# Patient Record
Sex: MALE | Race: WHITE | NOT HISPANIC OR LATINO | Employment: OTHER | URBAN - NONMETROPOLITAN AREA
[De-identification: names, ages, dates, MRNs, and addresses within clinical notes are randomized per-mention and may not be internally consistent; named-entity substitution may affect disease eponyms.]

---

## 2023-02-21 PROBLEM — D72.829 LEUKOCYTOSIS: Status: ACTIVE | Noted: 2023-02-21

## 2023-02-21 PROBLEM — K92.2 GASTROINTESTINAL BLEEDING: Status: ACTIVE | Noted: 2023-02-21

## 2023-02-21 PROBLEM — F41.9 ANXIETY: Status: ACTIVE | Noted: 2023-02-21

## 2023-02-21 PROBLEM — J90 PLEURAL EFFUSION, LEFT: Status: ACTIVE | Noted: 2023-02-21

## 2023-02-21 PROBLEM — M54.2 NECK PAIN: Status: ACTIVE | Noted: 2023-02-21

## 2023-02-21 PROBLEM — H91.90 HARD OF HEARING: Status: ACTIVE | Noted: 2023-02-21

## 2023-02-21 PROBLEM — M79.671 BILATERAL LEG AND FOOT PAIN: Status: ACTIVE | Noted: 2023-02-21

## 2023-02-21 PROBLEM — I71.43 INFRARENAL ABDOMINAL AORTIC ANEURYSM (AAA) WITHOUT RUPTURE (CMS-HCC): Status: ACTIVE | Noted: 2023-02-21

## 2023-02-21 PROBLEM — E11.40 DIABETIC NEUROPATHY (MULTI): Status: ACTIVE | Noted: 2023-02-21

## 2023-02-21 PROBLEM — R49.0 VOICE HOARSENESS: Status: ACTIVE | Noted: 2023-02-21

## 2023-02-21 PROBLEM — M79.89 LEFT LEG SWELLING: Status: ACTIVE | Noted: 2023-02-21

## 2023-02-21 PROBLEM — M79.604 BILATERAL LEG AND FOOT PAIN: Status: ACTIVE | Noted: 2023-02-21

## 2023-02-21 PROBLEM — M54.50 LUMBAGO: Status: ACTIVE | Noted: 2023-02-21

## 2023-02-21 PROBLEM — I48.91 ATRIAL FIBRILLATION (MULTI): Status: ACTIVE | Noted: 2023-02-21

## 2023-02-21 PROBLEM — R91.8 INFILTRATE OF LEFT LUNG PRESENT ON CHEST X-RAY: Status: ACTIVE | Noted: 2023-02-21

## 2023-02-21 PROBLEM — L98.9 NODULAR LESION ON SURFACE OF SKIN: Status: ACTIVE | Noted: 2023-02-21

## 2023-02-21 PROBLEM — E55.9 VITAMIN D DEFICIENCY: Status: ACTIVE | Noted: 2023-02-21

## 2023-02-21 PROBLEM — N40.1 BENIGN PROSTATIC HYPERPLASIA WITH LOWER URINARY TRACT SYMPTOMS: Status: ACTIVE | Noted: 2023-02-21

## 2023-02-21 PROBLEM — L03.119 CELLULITIS OF EXTREMITY: Status: ACTIVE | Noted: 2023-02-21

## 2023-02-21 PROBLEM — I71.40 ANEURYSM OF ABDOMINAL AORTA (CMS-HCC): Status: ACTIVE | Noted: 2023-02-21

## 2023-02-21 PROBLEM — K21.9 GERD (GASTROESOPHAGEAL REFLUX DISEASE): Status: ACTIVE | Noted: 2023-02-21

## 2023-02-21 PROBLEM — J35.8 TONSIL STONE: Status: ACTIVE | Noted: 2023-02-21

## 2023-02-21 PROBLEM — J20.9 ACUTE BRONCHITIS: Status: ACTIVE | Noted: 2023-02-21

## 2023-02-21 PROBLEM — B35.4 TINEA CORPORIS: Status: ACTIVE | Noted: 2023-02-21

## 2023-02-21 PROBLEM — L03.211 CELLULITIS OF FOREHEAD: Status: ACTIVE | Noted: 2023-02-21

## 2023-02-21 PROBLEM — M79.672 BILATERAL LEG AND FOOT PAIN: Status: ACTIVE | Noted: 2023-02-21

## 2023-02-21 PROBLEM — I73.9 PVD (PERIPHERAL VASCULAR DISEASE) (CMS-HCC): Status: ACTIVE | Noted: 2023-02-21

## 2023-02-21 PROBLEM — M19.90 OA (OSTEOARTHRITIS): Status: ACTIVE | Noted: 2023-02-21

## 2023-02-21 PROBLEM — R14.0 DISTENDED ABDOMEN: Status: ACTIVE | Noted: 2023-02-21

## 2023-02-21 PROBLEM — I50.9 CONGESTIVE HEART FAILURE (MULTI): Status: ACTIVE | Noted: 2023-02-21

## 2023-02-21 PROBLEM — J98.4 RESTRICTIVE LUNG DISEASE: Status: ACTIVE | Noted: 2023-02-21

## 2023-02-21 PROBLEM — R05.9 COUGH: Status: ACTIVE | Noted: 2023-02-21

## 2023-02-21 PROBLEM — I49.9 DYSRHYTHMIAS: Status: ACTIVE | Noted: 2023-02-21

## 2023-02-21 PROBLEM — R41.3 MEMORY DIFFICULTY: Status: ACTIVE | Noted: 2023-02-21

## 2023-02-21 PROBLEM — J38.3 VOCAL CORD MASS: Status: ACTIVE | Noted: 2023-02-21

## 2023-02-21 PROBLEM — I72.3 ILIAC ANEURYSM (CMS-HCC): Status: ACTIVE | Noted: 2023-02-21

## 2023-02-21 PROBLEM — E11.51 DM (DIABETES MELLITUS), TYPE 2 WITH PERIPHERAL VASCULAR COMPLICATIONS (MULTI): Status: ACTIVE | Noted: 2023-02-21

## 2023-02-21 PROBLEM — E78.5 HYPERLIPIDEMIA: Status: ACTIVE | Noted: 2023-02-21

## 2023-02-21 PROBLEM — R35.1 FREQUENT URINATION AT NIGHT: Status: ACTIVE | Noted: 2023-02-21

## 2023-02-21 PROBLEM — M79.605 BILATERAL LEG AND FOOT PAIN: Status: ACTIVE | Noted: 2023-02-21

## 2023-02-21 PROBLEM — B35.1 MYCOTIC TOENAILS: Status: ACTIVE | Noted: 2023-02-21

## 2023-02-21 PROBLEM — R04.0 EPISTAXIS: Status: ACTIVE | Noted: 2023-02-21

## 2023-02-21 PROBLEM — R21 RASH: Status: ACTIVE | Noted: 2023-02-21

## 2023-02-21 PROBLEM — J44.9 MODERATE CHRONIC OBSTRUCTIVE PULMONARY DISEASE (MULTI): Status: ACTIVE | Noted: 2023-02-21

## 2023-02-21 PROBLEM — R35.0 FREQUENCY OF URINATION: Status: ACTIVE | Noted: 2023-02-21

## 2023-02-21 PROBLEM — R13.10 DYSPHAGIA: Status: ACTIVE | Noted: 2023-02-21

## 2023-02-21 PROBLEM — K29.00 GASTRITIS, ACUTE: Status: ACTIVE | Noted: 2023-02-21

## 2023-02-21 PROBLEM — K52.9 DIARRHEAL DISEASE: Status: ACTIVE | Noted: 2023-02-21

## 2023-02-21 PROBLEM — M25.511 RIGHT SHOULDER PAIN: Status: ACTIVE | Noted: 2023-02-21

## 2023-02-21 PROBLEM — I25.10 ARTERIOSCLEROTIC CARDIOVASCULAR DISEASE (ASCVD): Status: ACTIVE | Noted: 2023-02-21

## 2023-02-21 PROBLEM — L82.1 SEBORRHEIC KERATOSIS: Status: ACTIVE | Noted: 2023-02-21

## 2023-02-21 PROBLEM — E11.9: Status: ACTIVE | Noted: 2023-02-21

## 2023-02-21 PROBLEM — R06.02 SOB (SHORTNESS OF BREATH) ON EXERTION: Status: ACTIVE | Noted: 2023-02-21

## 2023-02-21 RX ORDER — ASPIRIN 81 MG/1
TABLET ORAL
COMMUNITY
Start: 2014-11-18

## 2023-02-21 RX ORDER — METFORMIN HYDROCHLORIDE 1000 MG/1
1 TABLET ORAL DAILY
COMMUNITY
Start: 2015-04-03 | End: 2023-08-08

## 2023-02-21 RX ORDER — CLOTRIMAZOLE 1 %
CREAM (GRAM) TOPICAL 2 TIMES DAILY
COMMUNITY
Start: 2017-11-09 | End: 2024-02-20 | Stop reason: ALTCHOICE

## 2023-02-21 RX ORDER — AMLODIPINE AND BENAZEPRIL HYDROCHLORIDE 10; 40 MG/1; MG/1
1 CAPSULE ORAL DAILY
COMMUNITY
Start: 2014-05-09 | End: 2024-02-20 | Stop reason: SDUPTHER

## 2023-02-21 RX ORDER — ROSUVASTATIN CALCIUM 10 MG/1
TABLET, COATED ORAL
COMMUNITY

## 2023-02-21 RX ORDER — PANTOPRAZOLE SODIUM 40 MG/1
1 TABLET, DELAYED RELEASE ORAL DAILY
COMMUNITY
Start: 2018-10-31 | End: 2023-07-04

## 2023-02-21 RX ORDER — TAMSULOSIN HYDROCHLORIDE 0.4 MG/1
1 CAPSULE ORAL DAILY
COMMUNITY
Start: 2013-01-16 | End: 2023-03-17 | Stop reason: SDUPTHER

## 2023-02-21 RX ORDER — WARFARIN 4 MG/1
1 TABLET ORAL DAILY
COMMUNITY
End: 2023-07-19 | Stop reason: SDUPTHER

## 2023-02-21 RX ORDER — CARVEDILOL 3.12 MG/1
1 TABLET ORAL
COMMUNITY
Start: 2021-01-28 | End: 2023-03-19

## 2023-02-21 RX ORDER — LOVASTATIN 20 MG/1
1 TABLET ORAL NIGHTLY
COMMUNITY
Start: 2013-03-19 | End: 2024-05-21 | Stop reason: SDUPTHER

## 2023-02-21 RX ORDER — ACETAMINOPHEN 500 MG
TABLET ORAL
COMMUNITY
Start: 2019-12-06

## 2023-02-28 LAB
ANION GAP IN SER/PLAS: 13 MMOL/L (ref 10–20)
BASOPHILS (10*3/UL) IN BLOOD BY AUTOMATED COUNT: 0.03 X10E9/L (ref 0–0.1)
BASOPHILS/100 LEUKOCYTES IN BLOOD BY AUTOMATED COUNT: 0.3 % (ref 0–2)
CALCIUM (MG/DL) IN SER/PLAS: 9.9 MG/DL (ref 8.6–10.6)
CARBON DIOXIDE, TOTAL (MMOL/L) IN SER/PLAS: 28 MMOL/L (ref 21–32)
CHLORIDE (MMOL/L) IN SER/PLAS: 102 MMOL/L (ref 98–107)
CREATININE (MG/DL) IN SER/PLAS: 0.84 MG/DL (ref 0.5–1.3)
EOSINOPHILS (10*3/UL) IN BLOOD BY AUTOMATED COUNT: 0.13 X10E9/L (ref 0–0.4)
EOSINOPHILS/100 LEUKOCYTES IN BLOOD BY AUTOMATED COUNT: 1.3 % (ref 0–6)
ERYTHROCYTE DISTRIBUTION WIDTH (RATIO) BY AUTOMATED COUNT: 13 % (ref 11.5–14.5)
ERYTHROCYTE MEAN CORPUSCULAR HEMOGLOBIN CONCENTRATION (G/DL) BY AUTOMATED: 31.7 G/DL (ref 32–36)
ERYTHROCYTE MEAN CORPUSCULAR VOLUME (FL) BY AUTOMATED COUNT: 98 FL (ref 80–100)
ERYTHROCYTES (10*6/UL) IN BLOOD BY AUTOMATED COUNT: 4.28 X10E12/L (ref 4.5–5.9)
GFR MALE: 84 ML/MIN/1.73M2
GLUCOSE (MG/DL) IN SER/PLAS: 128 MG/DL (ref 74–99)
HEMATOCRIT (%) IN BLOOD BY AUTOMATED COUNT: 42 % (ref 41–52)
HEMOGLOBIN (G/DL) IN BLOOD: 13.3 G/DL (ref 13.5–17.5)
IMMATURE GRANULOCYTES/100 LEUKOCYTES IN BLOOD BY AUTOMATED COUNT: 0.3 % (ref 0–0.9)
LEUKOCYTES (10*3/UL) IN BLOOD BY AUTOMATED COUNT: 9.9 X10E9/L (ref 4.4–11.3)
LYMPHOCYTES (10*3/UL) IN BLOOD BY AUTOMATED COUNT: 0.77 X10E9/L (ref 0.8–3)
LYMPHOCYTES/100 LEUKOCYTES IN BLOOD BY AUTOMATED COUNT: 7.8 % (ref 13–44)
MONOCYTES (10*3/UL) IN BLOOD BY AUTOMATED COUNT: 1.11 X10E9/L (ref 0.05–0.8)
MONOCYTES/100 LEUKOCYTES IN BLOOD BY AUTOMATED COUNT: 11.2 % (ref 2–10)
NEUTROPHILS (10*3/UL) IN BLOOD BY AUTOMATED COUNT: 7.86 X10E9/L (ref 1.6–5.5)
NEUTROPHILS/100 LEUKOCYTES IN BLOOD BY AUTOMATED COUNT: 79.1 % (ref 40–80)
NRBC (PER 100 WBCS) BY AUTOMATED COUNT: 0 /100 WBC (ref 0–0)
PLATELETS (10*3/UL) IN BLOOD AUTOMATED COUNT: 296 X10E9/L (ref 150–450)
POTASSIUM (MMOL/L) IN SER/PLAS: 4.6 MMOL/L (ref 3.5–5.3)
SODIUM (MMOL/L) IN SER/PLAS: 138 MMOL/L (ref 136–145)
UREA NITROGEN (MG/DL) IN SER/PLAS: 14 MG/DL (ref 6–23)

## 2023-03-11 LAB — SARS-COV-2 RESULT: NOT DETECTED

## 2023-03-13 LAB
ACTIVATED PARTIAL THROMBOPLASTIN TIME IN PPP BY COAGULATION ASSAY: 37 SEC (ref 26–39)
INR IN PPP BY COAGULATION ASSAY: 1.9 (ref 0.9–1.1)
PROTHROMBIN TIME (PT) IN PPP BY COAGULATION ASSAY: 22.4 SEC (ref 9.8–13.4)

## 2023-03-15 DIAGNOSIS — I10 PRIMARY HYPERTENSION: Primary | ICD-10-CM

## 2023-03-15 DIAGNOSIS — N40.0 BENIGN PROSTATIC HYPERPLASIA, UNSPECIFIED WHETHER LOWER URINARY TRACT SYMPTOMS PRESENT: ICD-10-CM

## 2023-03-19 RX ORDER — CARVEDILOL 3.12 MG/1
TABLET ORAL
Qty: 180 TABLET | Refills: 3 | Status: SHIPPED | OUTPATIENT
Start: 2023-03-19 | End: 2024-02-20 | Stop reason: SDUPTHER

## 2023-03-19 RX ORDER — TAMSULOSIN HYDROCHLORIDE 0.4 MG/1
0.4 CAPSULE ORAL DAILY
Qty: 30 CAPSULE | Refills: 3 | Status: SHIPPED | OUTPATIENT
Start: 2023-03-19 | End: 2023-07-12 | Stop reason: SDUPTHER

## 2023-03-20 ENCOUNTER — CLINICAL SUPPORT (OUTPATIENT)
Dept: PRIMARY CARE | Facility: CLINIC | Age: 88
End: 2023-03-20
Payer: MEDICARE

## 2023-03-20 DIAGNOSIS — I48.11 LONGSTANDING PERSISTENT ATRIAL FIBRILLATION (MULTI): Primary | ICD-10-CM

## 2023-03-20 LAB
POC INR: 2 (ref 0.9–1.1)
POC PROTHROMBIN TIME: 2 (ref 9.3–12.5)

## 2023-03-20 PROCEDURE — 85610 PROTHROMBIN TIME: CPT | Performed by: INTERNAL MEDICINE

## 2023-03-24 NOTE — RESULT ENCOUNTER NOTE
INR is on point, if he's been on the same dose then repeat in 1 month, if recently changed then repeat in 1 to 2 weeks

## 2023-04-25 ENCOUNTER — APPOINTMENT (OUTPATIENT)
Dept: PRIMARY CARE | Facility: CLINIC | Age: 88
End: 2023-04-25
Payer: MEDICARE

## 2023-05-02 ENCOUNTER — CLINICAL SUPPORT (OUTPATIENT)
Dept: PRIMARY CARE | Facility: CLINIC | Age: 88
End: 2023-05-02
Payer: MEDICARE

## 2023-05-02 LAB
POC INR: 2
POC PROTHROMBIN TIME: NORMAL

## 2023-05-02 PROCEDURE — 85610 PROTHROMBIN TIME: CPT | Performed by: INTERNAL MEDICINE

## 2023-06-06 ENCOUNTER — CLINICAL SUPPORT (OUTPATIENT)
Dept: PRIMARY CARE | Facility: CLINIC | Age: 88
End: 2023-06-06
Payer: MEDICARE

## 2023-06-06 LAB
POC INR: 2
POC PROTHROMBIN TIME: NORMAL

## 2023-07-01 DIAGNOSIS — K21.9 GASTRO-ESOPHAGEAL REFLUX DISEASE WITHOUT ESOPHAGITIS: ICD-10-CM

## 2023-07-03 ENCOUNTER — CLINICAL SUPPORT (OUTPATIENT)
Dept: PRIMARY CARE | Facility: CLINIC | Age: 88
End: 2023-07-03
Payer: MEDICARE

## 2023-07-03 LAB
POC INR: 1.9
POC PROTHROMBIN TIME: NORMAL

## 2023-07-04 RX ORDER — PANTOPRAZOLE SODIUM 40 MG/1
TABLET, DELAYED RELEASE ORAL
Qty: 90 TABLET | Refills: 1 | Status: SHIPPED | OUTPATIENT
Start: 2023-07-04 | End: 2023-12-28 | Stop reason: SDUPTHER

## 2023-07-12 DIAGNOSIS — N40.0 BENIGN PROSTATIC HYPERPLASIA, UNSPECIFIED WHETHER LOWER URINARY TRACT SYMPTOMS PRESENT: ICD-10-CM

## 2023-07-12 DIAGNOSIS — Z79.01 ANTICOAGULANT LONG-TERM USE: Primary | ICD-10-CM

## 2023-07-12 RX ORDER — TAMSULOSIN HYDROCHLORIDE 0.4 MG/1
0.4 CAPSULE ORAL DAILY
Qty: 90 CAPSULE | Refills: 3 | Status: SHIPPED | OUTPATIENT
Start: 2023-07-12 | End: 2024-05-21 | Stop reason: SDUPTHER

## 2023-07-19 DIAGNOSIS — I48.11 LONGSTANDING PERSISTENT ATRIAL FIBRILLATION (MULTI): Primary | ICD-10-CM

## 2023-07-19 RX ORDER — WARFARIN 4 MG/1
TABLET ORAL
Qty: 60 TABLET | Refills: 11 | Status: SHIPPED | OUTPATIENT
Start: 2023-07-19

## 2023-07-31 ENCOUNTER — LAB (OUTPATIENT)
Dept: LAB | Facility: LAB | Age: 88
End: 2023-07-31
Payer: MEDICARE

## 2023-07-31 ENCOUNTER — OFFICE VISIT (OUTPATIENT)
Dept: PRIMARY CARE | Facility: CLINIC | Age: 88
End: 2023-07-31
Payer: MEDICARE

## 2023-07-31 VITALS
SYSTOLIC BLOOD PRESSURE: 118 MMHG | OXYGEN SATURATION: 98 % | BODY MASS INDEX: 29.86 KG/M2 | DIASTOLIC BLOOD PRESSURE: 68 MMHG | HEIGHT: 69 IN | TEMPERATURE: 98 F | WEIGHT: 201.6 LBS | HEART RATE: 65 BPM

## 2023-07-31 DIAGNOSIS — I25.10 ARTERIOSCLEROTIC CARDIOVASCULAR DISEASE (ASCVD): ICD-10-CM

## 2023-07-31 DIAGNOSIS — Z79.899 HIGH RISK MEDICATION USE: ICD-10-CM

## 2023-07-31 DIAGNOSIS — E11.51 DM (DIABETES MELLITUS), TYPE 2 WITH PERIPHERAL VASCULAR COMPLICATIONS (MULTI): ICD-10-CM

## 2023-07-31 DIAGNOSIS — Z98.890 S/P AAA REPAIR: ICD-10-CM

## 2023-07-31 DIAGNOSIS — N40.1 BENIGN PROSTATIC HYPERPLASIA WITH LOWER URINARY TRACT SYMPTOMS, SYMPTOM DETAILS UNSPECIFIED: ICD-10-CM

## 2023-07-31 DIAGNOSIS — Z86.79 S/P AAA REPAIR: ICD-10-CM

## 2023-07-31 DIAGNOSIS — R49.0 VOICE HOARSENESS: ICD-10-CM

## 2023-07-31 DIAGNOSIS — C32.9 LARYNGEAL CANCER (MULTI): Primary | ICD-10-CM

## 2023-07-31 DIAGNOSIS — D72.829 LEUKOCYTOSIS, UNSPECIFIED TYPE: ICD-10-CM

## 2023-07-31 PROBLEM — M79.604 BILATERAL LEG AND FOOT PAIN: Status: RESOLVED | Noted: 2023-02-21 | Resolved: 2023-07-31

## 2023-07-31 PROBLEM — R04.0 EPISTAXIS: Status: RESOLVED | Noted: 2023-02-21 | Resolved: 2023-07-31

## 2023-07-31 PROBLEM — J38.3 VOCAL CORD MASS: Status: RESOLVED | Noted: 2023-02-21 | Resolved: 2023-07-31

## 2023-07-31 PROBLEM — C32.0 MALIGNANT NEOPLASM OF GLOTTIS (MULTI): Status: ACTIVE | Noted: 2023-07-31

## 2023-07-31 PROBLEM — J35.8 TONSIL STONE: Status: RESOLVED | Noted: 2023-02-21 | Resolved: 2023-07-31

## 2023-07-31 PROBLEM — M79.672 BILATERAL LEG AND FOOT PAIN: Status: RESOLVED | Noted: 2023-02-21 | Resolved: 2023-07-31

## 2023-07-31 PROBLEM — I71.40 ANEURYSM OF ABDOMINAL AORTA (CMS-HCC): Status: RESOLVED | Noted: 2023-02-21 | Resolved: 2023-07-31

## 2023-07-31 PROBLEM — J20.9 ACUTE BRONCHITIS: Status: RESOLVED | Noted: 2023-02-21 | Resolved: 2023-07-31

## 2023-07-31 PROBLEM — B35.4 TINEA CORPORIS: Status: RESOLVED | Noted: 2023-02-21 | Resolved: 2023-07-31

## 2023-07-31 PROBLEM — M79.671 BILATERAL LEG AND FOOT PAIN: Status: RESOLVED | Noted: 2023-02-21 | Resolved: 2023-07-31

## 2023-07-31 PROBLEM — C32.0 MALIGNANT NEOPLASM OF GLOTTIS (MULTI): Status: RESOLVED | Noted: 2023-07-31 | Resolved: 2023-07-31

## 2023-07-31 PROBLEM — M79.605 BILATERAL LEG AND FOOT PAIN: Status: RESOLVED | Noted: 2023-02-21 | Resolved: 2023-07-31

## 2023-07-31 PROBLEM — M25.511 RIGHT SHOULDER PAIN: Status: RESOLVED | Noted: 2023-02-21 | Resolved: 2023-07-31

## 2023-07-31 PROBLEM — R35.0 FREQUENCY OF URINATION: Status: RESOLVED | Noted: 2023-02-21 | Resolved: 2023-07-31

## 2023-07-31 LAB
ANION GAP IN SER/PLAS: 11 MMOL/L (ref 10–20)
BASOPHILS (10*3/UL) IN BLOOD BY AUTOMATED COUNT: 0.04 X10E9/L (ref 0–0.1)
BASOPHILS/100 LEUKOCYTES IN BLOOD BY AUTOMATED COUNT: 0.5 % (ref 0–2)
CALCIUM (MG/DL) IN SER/PLAS: 9.4 MG/DL (ref 8.6–10.3)
CARBON DIOXIDE, TOTAL (MMOL/L) IN SER/PLAS: 29 MMOL/L (ref 21–32)
CHLORIDE (MMOL/L) IN SER/PLAS: 102 MMOL/L (ref 98–107)
CREATININE (MG/DL) IN SER/PLAS: 0.92 MG/DL (ref 0.5–1.3)
EOSINOPHILS (10*3/UL) IN BLOOD BY AUTOMATED COUNT: 0.13 X10E9/L (ref 0–0.4)
EOSINOPHILS/100 LEUKOCYTES IN BLOOD BY AUTOMATED COUNT: 1.6 % (ref 0–6)
ERYTHROCYTE DISTRIBUTION WIDTH (RATIO) BY AUTOMATED COUNT: 13.2 % (ref 11.5–14.5)
ERYTHROCYTE MEAN CORPUSCULAR HEMOGLOBIN CONCENTRATION (G/DL) BY AUTOMATED: 32 G/DL (ref 32–36)
ERYTHROCYTE MEAN CORPUSCULAR VOLUME (FL) BY AUTOMATED COUNT: 97 FL (ref 80–100)
ERYTHROCYTES (10*6/UL) IN BLOOD BY AUTOMATED COUNT: 4.43 X10E12/L (ref 4.5–5.9)
GFR MALE: 80 ML/MIN/1.73M2
GLUCOSE (MG/DL) IN SER/PLAS: 145 MG/DL (ref 74–99)
HEMATOCRIT (%) IN BLOOD BY AUTOMATED COUNT: 43.1 % (ref 41–52)
HEMOGLOBIN (G/DL) IN BLOOD: 13.8 G/DL (ref 13.5–17.5)
IMMATURE GRANULOCYTES/100 LEUKOCYTES IN BLOOD BY AUTOMATED COUNT: 0.4 % (ref 0–0.9)
LEUKOCYTES (10*3/UL) IN BLOOD BY AUTOMATED COUNT: 8 X10E9/L (ref 4.4–11.3)
LYMPHOCYTES (10*3/UL) IN BLOOD BY AUTOMATED COUNT: 0.58 X10E9/L (ref 0.8–3)
LYMPHOCYTES/100 LEUKOCYTES IN BLOOD BY AUTOMATED COUNT: 7.2 % (ref 13–44)
MONOCYTES (10*3/UL) IN BLOOD BY AUTOMATED COUNT: 0.99 X10E9/L (ref 0.05–0.8)
MONOCYTES/100 LEUKOCYTES IN BLOOD BY AUTOMATED COUNT: 12.3 % (ref 2–10)
NEUTROPHILS (10*3/UL) IN BLOOD BY AUTOMATED COUNT: 6.25 X10E9/L (ref 1.6–5.5)
NEUTROPHILS/100 LEUKOCYTES IN BLOOD BY AUTOMATED COUNT: 78 % (ref 40–80)
PLATELETS (10*3/UL) IN BLOOD AUTOMATED COUNT: 274 X10E9/L (ref 150–450)
POC FINGERSTICK BLOOD GLUCOSE: 203 MG/DL (ref 70–100)
POC HEMOGLOBIN A1C: 6.2 % (ref 4.2–6.5)
POTASSIUM (MMOL/L) IN SER/PLAS: 4.3 MMOL/L (ref 3.5–5.3)
SODIUM (MMOL/L) IN SER/PLAS: 138 MMOL/L (ref 136–145)
UREA NITROGEN (MG/DL) IN SER/PLAS: 12 MG/DL (ref 6–23)

## 2023-07-31 PROCEDURE — 99215 OFFICE O/P EST HI 40 MIN: CPT | Performed by: INTERNAL MEDICINE

## 2023-07-31 PROCEDURE — 82962 GLUCOSE BLOOD TEST: CPT | Performed by: INTERNAL MEDICINE

## 2023-07-31 PROCEDURE — 36415 COLL VENOUS BLD VENIPUNCTURE: CPT

## 2023-07-31 PROCEDURE — 80048 BASIC METABOLIC PNL TOTAL CA: CPT

## 2023-07-31 PROCEDURE — 83036 HEMOGLOBIN GLYCOSYLATED A1C: CPT | Performed by: INTERNAL MEDICINE

## 2023-07-31 PROCEDURE — 1160F RVW MEDS BY RX/DR IN RCRD: CPT | Performed by: INTERNAL MEDICINE

## 2023-07-31 PROCEDURE — 1159F MED LIST DOCD IN RCRD: CPT | Performed by: INTERNAL MEDICINE

## 2023-07-31 PROCEDURE — 1126F AMNT PAIN NOTED NONE PRSNT: CPT | Performed by: INTERNAL MEDICINE

## 2023-07-31 PROCEDURE — 1036F TOBACCO NON-USER: CPT | Performed by: INTERNAL MEDICINE

## 2023-07-31 PROCEDURE — 85025 COMPLETE CBC W/AUTO DIFF WBC: CPT

## 2023-07-31 PROCEDURE — 1157F ADVNC CARE PLAN IN RCRD: CPT | Performed by: INTERNAL MEDICINE

## 2023-07-31 ASSESSMENT — ENCOUNTER SYMPTOMS
SINUS PAIN: 0
SORE THROAT: 0
DEPRESSION: 0
BLOOD IN STOOL: 0
DIARRHEA: 0
WHEEZING: 0
ABDOMINAL PAIN: 0
LOSS OF SENSATION IN FEET: 0
DIZZINESS: 0
HEADACHES: 0
OCCASIONAL FEELINGS OF UNSTEADINESS: 0
ARTHRALGIAS: 0
FEVER: 0
PALPITATIONS: 0
BRUISES/BLEEDS EASILY: 0
UNEXPECTED WEIGHT CHANGE: 0
COUGH: 0
DIFFICULTY URINATING: 0
FATIGUE: 1

## 2023-07-31 ASSESSMENT — PATIENT HEALTH QUESTIONNAIRE - PHQ9
SUM OF ALL RESPONSES TO PHQ9 QUESTIONS 1 AND 2: 0
2. FEELING DOWN, DEPRESSED OR HOPELESS: NOT AT ALL
1. LITTLE INTEREST OR PLEASURE IN DOING THINGS: NOT AT ALL

## 2023-07-31 NOTE — PROGRESS NOTES
"Subjective   Patient ID: Jayjay García is a 87 y.o. male who presents for New Patient Visit (No concerns/Just coumadin testing).    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, comes today to establish new primary care physician  -History partly obtained from wife, patient is hard of hearing    -Recurrent left pleural effusion compensated no recurrence  -- s/p multiple taps, s/p Pleurx placement, removed 6/2021 no recurrence  -Status post abdominal aneurysm repair, Infrarenal AAA doing well follow-up vascular surgery as recommended  -Laryngeal cancer and vocal cord partial resection doing well no recurrence follow-up otolaryngology  - Diabetes diet controlled last hemoglobin A1c 6.2  - Atherosclerotic artery disease compensated  -Need to proceed with blood work  Reevaluate patient in 3 months  Counseled about diet controlled exercise multivitamins                Review of Systems   Constitutional:  Positive for fatigue. Negative for fever and unexpected weight change.   HENT:  Negative for congestion, ear discharge, ear pain, mouth sores, sinus pain and sore throat.    Eyes:  Negative for visual disturbance.   Respiratory:  Negative for cough and wheezing.    Cardiovascular:  Negative for chest pain, palpitations and leg swelling.   Gastrointestinal:  Negative for abdominal pain, blood in stool and diarrhea.   Genitourinary:  Negative for difficulty urinating.   Musculoskeletal:  Negative for arthralgias.   Skin:  Negative for rash.   Neurological:  Negative for dizziness and headaches.   Hematological:  Does not bruise/bleed easily.   Psychiatric/Behavioral:  Negative for behavioral problems.    All other systems reviewed and are negative.      Objective   Lab Results   Component Value Date    HGBA1C 6.2 07/31/2023      /68   Pulse 65   Temp 36.7 °C (98 °F)   Ht 1.753 m (5' 9\")   Wt " 91.4 kg (201 lb 9.6 oz)   SpO2 98%   BMI 29.77 kg/m²   Lab Results   Component Value Date    WBC 8.0 07/31/2023    HGB 13.8 07/31/2023    HCT 43.1 07/31/2023     07/31/2023    CHOL 123 12/06/2022    TRIG 75 12/06/2022    HDL 50.0 12/06/2022    ALT 13 12/06/2022    AST 16 12/06/2022     07/31/2023    K 4.3 07/31/2023     07/31/2023    CREATININE 0.92 07/31/2023    BUN 12 07/31/2023    CO2 29 07/31/2023    TSH 0.74 07/08/2022    INR 1.90 07/03/2023    HGBA1C 6.2 07/31/2023     par   Physical Exam  Vitals and nursing note reviewed.   Constitutional:       Appearance: Normal appearance.   HENT:      Head: Normocephalic.      Right Ear: There is no impacted cerumen.      Left Ear: There is no impacted cerumen.      Nose: Nose normal.   Eyes:      Conjunctiva/sclera: Conjunctivae normal.      Pupils: Pupils are equal, round, and reactive to light.   Cardiovascular:      Rate and Rhythm: Regular rhythm.   Pulmonary:      Effort: Pulmonary effort is normal.      Breath sounds: Normal breath sounds.   Abdominal:      General: Abdomen is flat.      Palpations: Abdomen is soft.   Musculoskeletal:      Cervical back: Neck supple.   Skin:     General: Skin is warm.   Neurological:      General: No focal deficit present.      Mental Status: He is oriented to person, place, and time.   Psychiatric:         Mood and Affect: Mood normal.         Assessment/Plan   Jayjay was seen today for new patient visit.  Diagnoses and all orders for this visit:  Laryngeal cancer (CMS/HCC) (Primary)  DM (diabetes mellitus), type 2 with peripheral vascular complications (CMS/HCC)  -     POCT Fingerstick Glucose manually resulted  -     POCT glycosylated hemoglobin (Hb A1C) manually resulted  -     Basic metabolic panel; Future  S/P AAA repair  Benign prostatic hyperplasia with lower urinary tract symptoms, symptom details unspecified  Voice hoarseness  Leukocytosis, unspecified type  Arteriosclerotic cardiovascular disease  (ASCVD)  High risk medication use  -     CBC and Auto Differential; Future  Other orders  -     Follow Up In Primary Care - Established; Future   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, comes today to establish new primary care physician  -History partly obtained from wife, patient is hard of hearing    -Recurrent left pleural effusion compensated no recurrence  -- s/p multiple taps, s/p Pleurx placement, removed 6/2021 no recurrence  -Status post abdominal aneurysm repair, Infrarenal AAA doing well follow-up vascular surgery as recommended  -Laryngeal cancer and vocal cord partial resection doing well no recurrence follow-up otolaryngology  - Diabetes diet controlled last hemoglobin A1c 6.2  - Atherosclerotic artery disease compensated  -Need to proceed with blood work  Reevaluate patient in 3 months  Counseled about diet controlled exercise multivitamins

## 2023-08-03 DIAGNOSIS — I48.11 LONGSTANDING PERSISTENT ATRIAL FIBRILLATION (MULTI): ICD-10-CM

## 2023-08-07 DIAGNOSIS — E11.9: Primary | ICD-10-CM

## 2023-08-08 RX ORDER — METFORMIN HYDROCHLORIDE 1000 MG/1
1000 TABLET ORAL DAILY
Qty: 90 TABLET | Refills: 2 | Status: SHIPPED | OUTPATIENT
Start: 2023-08-08

## 2023-10-05 ENCOUNTER — ANTICOAGULATION - WARFARIN VISIT (OUTPATIENT)
Dept: PHARMACY | Facility: HOSPITAL | Age: 88
End: 2023-10-05
Payer: MEDICARE

## 2023-10-05 DIAGNOSIS — I48.91 ATRIAL FIBRILLATION, UNSPECIFIED TYPE (MULTI): Primary | ICD-10-CM

## 2023-10-05 DIAGNOSIS — I48.11 LONGSTANDING PERSISTENT ATRIAL FIBRILLATION (MULTI): ICD-10-CM

## 2023-10-05 LAB
POC INR: 2.1 (ref 2–3)
POC PROTHROMBIN TIME: ABNORMAL

## 2023-10-05 PROCEDURE — 85610 PROTHROMBIN TIME: CPT | Performed by: PHARMACY

## 2023-10-05 NOTE — PROGRESS NOTES
Patient identification verified with 2 identifiers.    Location: [facility]     Referring Physician: Dr. Lisa Nichols   INR Goal: 2.0-3.0  Anticoagulation Medication: warfarin  Indication: atrial fibrillation    Subjective   Bleeding signs/symptoms: No    Bruising: No   Major bleeding event: No  Thrombosis signs/symptoms: No  Thromboembolic event: No  Missed doses: No  Extra doses: No  Medication changes: No  Dietary changes: No  Change in health: No  Change in activity: No  Alcohol: No  Other concerns: No    Upcoming Surgeries:  Does the Patient Have any upcoming surgeries that require interruption in anticoagulation therapy? no  Does the patient require bridging? no      Objective   INR Today: 2.1  Current dose: 1.8      Anticoagulation Summary  As of 10/5/2023      INR goal:  2.0-3.0   TTR:  --   INR used for dosing:     Weekly warfarin total:  30 mg               Assessment/Plan   Current INR is Therapeutic. Previous INR was subtherapeutic at 1.8. At previous visit two weeks ago, weekly dose was increased from 28 mg/week to 30 mg/week. Plan to continue increased dose.     1. New dose: no change . Continue current regimen of 4 mg daily except 6 mg on Thursdays.   2. Next INR: 1 month    Araseli Seaman, PharmD, BCPS

## 2023-10-25 ENCOUNTER — HOSPITAL ENCOUNTER (OUTPATIENT)
Dept: VASCULAR MEDICINE | Facility: CLINIC | Age: 88
Discharge: HOME | End: 2023-10-25
Payer: MEDICARE

## 2023-10-25 ENCOUNTER — OFFICE VISIT (OUTPATIENT)
Dept: VASCULAR SURGERY | Facility: CLINIC | Age: 88
End: 2023-10-25
Payer: MEDICARE

## 2023-10-25 VITALS
OXYGEN SATURATION: 92 % | SYSTOLIC BLOOD PRESSURE: 155 MMHG | DIASTOLIC BLOOD PRESSURE: 92 MMHG | HEART RATE: 44 BPM | WEIGHT: 199.56 LBS | BODY MASS INDEX: 29.47 KG/M2

## 2023-10-25 DIAGNOSIS — Z86.79 STATUS POST ENDOVASCULAR ANEURYSM REPAIR (EVAR): Primary | ICD-10-CM

## 2023-10-25 DIAGNOSIS — I71.43 INFRARENAL ABDOMINAL AORTIC ANEURYSM (AAA) WITHOUT RUPTURE (CMS-HCC): ICD-10-CM

## 2023-10-25 DIAGNOSIS — Z98.890 STATUS POST ENDOVASCULAR ANEURYSM REPAIR (EVAR): Primary | ICD-10-CM

## 2023-10-25 DIAGNOSIS — I71.40 ABDOMINAL AORTIC ANEURYSM, WITHOUT RUPTURE, UNSPECIFIED (CMS-HCC): ICD-10-CM

## 2023-10-25 DIAGNOSIS — I71.43 INFRARENAL ABDOMINAL AORTIC ANEURYSM, WITHOUT RUPTURE (CMS-HCC): ICD-10-CM

## 2023-10-25 PROCEDURE — 99213 OFFICE O/P EST LOW 20 MIN: CPT | Performed by: NURSE PRACTITIONER

## 2023-10-25 PROCEDURE — 1159F MED LIST DOCD IN RCRD: CPT | Performed by: NURSE PRACTITIONER

## 2023-10-25 PROCEDURE — 1036F TOBACCO NON-USER: CPT | Performed by: NURSE PRACTITIONER

## 2023-10-25 PROCEDURE — 1126F AMNT PAIN NOTED NONE PRSNT: CPT | Performed by: NURSE PRACTITIONER

## 2023-10-25 PROCEDURE — 1160F RVW MEDS BY RX/DR IN RCRD: CPT | Performed by: NURSE PRACTITIONER

## 2023-10-25 PROCEDURE — 99213 OFFICE O/P EST LOW 20 MIN: CPT | Mod: PO | Performed by: NURSE PRACTITIONER

## 2023-10-25 PROCEDURE — 93978 VASCULAR STUDY: CPT

## 2023-10-25 PROCEDURE — 93978 VASCULAR STUDY: CPT | Performed by: SURGERY

## 2023-10-25 NOTE — PATIENT INSTRUCTIONS
Mr. García, it was really great to see you again!  I am glad your surgery went well.    We reviewed the results of your aortic ultrasound which was inconclusive due to overlying bowel gas.  It appears that the aneurysm is slightly larger than what it was on your CAT scan.  I will discuss these results with Dr. Avitia and follow-up with you by phone.  I favor follow-up in 6 months with a repeat aortic duplex.    Please schedule your appointment for 6 months.

## 2023-10-25 NOTE — PROGRESS NOTES
History Of Present Illness  Jayjay García is a 87 y.o. male with a past medical history of endovascular aneurysm repair at [facility] in March 2023.  Patient had a postoperative CT angiogram abdomen and pelvis which showed a patent stent graft with no evidence of endoleak.  The residual aneurysm sac at that time was 5.5 cm.  Patient returns to the office today for follow-up.  He offers no specific complaint.  He is accompanied by his wife who provides most of the history.  Denies any abdominal pain or back pain related to the aorta.     Past Medical History  He has a past medical history of Elevated prostate specific antigen (PSA), Other conditions influencing health status, Overactive bladder, and Personal history of other endocrine, nutritional and metabolic disease (10/29/2013).    Surgical History  He has a past surgical history that includes Cataract extraction (05/15/2013); Other surgical history (01/28/2021); Cardiac surgery (11/18/2014); CT angio abdomen pelvis w and or wo IV IV contrast (7/8/2022); CT angio abdomen pelvis w and or wo IV IV contrast (4/12/2023); and IR angiogram aorta abdomen (3/14/2023).     Social History  He reports that he has never smoked. He has never used smokeless tobacco. He reports current alcohol use of about 21.0 standard drinks of alcohol per week. He reports that he does not use drugs.    Family History  Family History   Problem Relation Name Age of Onset    Cancer Mother      Cancer Father      Heart disease Brother          Allergies  Patient has no known allergies.    Review of Systems    CONSTITUTIONAL: Denies weight loss, fever and chills.    HEENT: Denies changes in vision and hearing.    RESPIRATORY: Denies SOB and cough.    CV: Denies palpitations and CP.    GI: Denies abdominal pain, nausea, vomiting and diarrhea.    : Denies dysuria and urinary frequency.    MSK: Denies myalgia and joint pain.    SKIN: Denies rash and pruritus.    VASC: Denies claudication, ischemic  rest pain, or open wounds or sores    NEUROLOGICAL: Denies headache and syncope.    PSYCHIATRIC: Denies recent changes in mood. Denies anxiety and depression.     Physical Exam    General: Pt is alert and oriented x 3. Pleasant, conversive  HEENT: Head is atraumatic, normocephalic. No cervical bruits  Cardiac: Normal S1-S2.  Regular rate and rhythm.  No murmurs.  Respiratory: Lungs clear to auscultation.  No adventitious sounds.  Abdomen: Soft, nondistended, nontender.  Bowel sounds x4 quadrants.  Pulse exam: Palpable brachial and radial pulses bilaterally.  Extremities: No significant edema noted.  Extremities are warm to the touch and normal in color.  No open wounds or sores.  Neuro: Moves all extremities spontaneously.  No focal deficits.  Psych: Appropriate affect.  Answers questions appropriately.     Last Recorded Vitals  BP (!) 155/92 (BP Location: Left arm, Patient Position: Sitting)   Pulse (!) 44   Wt 90.5 kg (199 lb 9 oz)   SpO2 92%     Relevant Results  I alongside  Current Outpatient Medications   Medication Instructions    amLODIPine-benazepriL (Lotrel) 10-40 mg capsule 1 capsule, oral, Daily    aspirin 81 mg EC tablet oral    carvedilol (Coreg) 3.125 mg tablet TAKE 1 TABLET TWICE A DAY  WITH BREAKFAST AND DINNER    cholecalciferol (Vitamin D-3) 5,000 Units tablet oral    clotrimazole (Lotrimin) 1 % cream 2 times daily, Apply sparingly    lovastatin (Mevacor) 20 mg tablet 1 tablet, oral, Nightly    metFORMIN (GLUCOPHAGE) 1,000 mg, oral, Daily, Take with food.    pantoprazole (ProtoNix) 40 mg EC tablet TAKE 1 TABLET BY MOUTH EVERY DAY    rosuvastatin (Crestor) 10 mg tablet oral    tamsulosin (FLOMAX) 0.4 mg, oral, Daily    warfarin (Coumadin) 4 mg tablet Take as directed base on INR value      Vascular US aorta iliac duplex complete    Result Date: 10/25/2023  Preliminary Cardiology Report           [place] [id number] Tel [phone] and Fax [phone]     Preliminary Vascular Lab Report  [facility] US AORTA ILIAC DUPLEX COMPLETE  Patient Name:      SHAHAB VERA Reading Physician:  [id number] Marianne Avitia MD Study Date:        10/25/2023      Ordering Physician: [id number] MARIANNE AVITIA MRN/PID:           [id number]        Technologist:       Ariane Noriega T Accession#:        [id number]    Technologist 2: Date of Birth/Age: 1935       Encounter#:         [id number] Gender:            M Admission Status:  Outpatient      Location Performed: [facility]  Diagnosis/ICD: Infrarenal abdominal aortic aneurysm, without rupture-I71.43 Indication:    Abdominal aortic aneurysm  Patient History: H/O endovascular repair of infrarenal abdominal aortic                  aneurysm.  PRELIMINARY CONCLUSIONS: Aorta/Common Iliac Arteries/IVC: Technically challenging exam due to body habitus and overlying bowel gas. Endovascular stent repair appears to be patent throughout. There does not appear to be evidence of endoleak. Largest aneurysmal sac, at the mid/distal level appears to measure 5.85cm with sagittal view. Bilateral graft limbs are noted to be patent.  Imaging & Doppler Findings:   AORTA     AP    Lateral    PSV Proximal 1.79 cm         61.0 cm/s   Mid    5.85 cm  Distal  3.80 cm 3.35 cm                    Right    Left  Prox Endograft  47 cm/s  53 cm/s  Mid Endograft   67 cm/s  70 cm/s Distal Endograft 126 cm/s 78 cm/s   VASCULAR PRELIMINARY REPORT completed by Ariane Noriega NIURKA on 10/25/2023 at 10:56:53 AM  ** Final **        Assessment/Plan       1.  Status post endovascular abdominal aortic aneurysm repair in March 2023.  Follow-up CTA from April 2023 reviewed.  No endoleak noted.  Residual aneurysm sac measured 5.6 cm at that time.  Patient had a follow-up aortic duplex today which was a technically difficult study secondary to overlying bowel gas.  The residual aneurysm sac appeared to be maximal diameter 5.8 cm.  I did discuss the patient in detail with   Adore who recommended follow-up in 6 months with a repeat aortic duplex.  Patient is asymptomatic.       Clay Hare, APRN-CNP

## 2023-10-31 ENCOUNTER — OFFICE VISIT (OUTPATIENT)
Dept: PRIMARY CARE | Facility: CLINIC | Age: 88
End: 2023-10-31
Payer: MEDICARE

## 2023-10-31 VITALS
SYSTOLIC BLOOD PRESSURE: 120 MMHG | TEMPERATURE: 97.1 F | HEART RATE: 59 BPM | WEIGHT: 202.4 LBS | HEIGHT: 68 IN | BODY MASS INDEX: 30.68 KG/M2 | OXYGEN SATURATION: 98 % | DIASTOLIC BLOOD PRESSURE: 80 MMHG

## 2023-10-31 DIAGNOSIS — I48.11 LONGSTANDING PERSISTENT ATRIAL FIBRILLATION (MULTI): ICD-10-CM

## 2023-10-31 DIAGNOSIS — Z13.89 SCREENING FOR MULTIPLE CONDITIONS: ICD-10-CM

## 2023-10-31 DIAGNOSIS — E55.9 VITAMIN D DEFICIENCY, UNSPECIFIED: ICD-10-CM

## 2023-10-31 DIAGNOSIS — E78.00 HYPERCHOLESTEREMIA: ICD-10-CM

## 2023-10-31 DIAGNOSIS — E11.51 DM (DIABETES MELLITUS), TYPE 2 WITH PERIPHERAL VASCULAR COMPLICATIONS (MULTI): ICD-10-CM

## 2023-10-31 DIAGNOSIS — R53.83 OTHER FATIGUE: ICD-10-CM

## 2023-10-31 DIAGNOSIS — I25.10 ARTERIOSCLEROTIC CARDIOVASCULAR DISEASE (ASCVD): ICD-10-CM

## 2023-10-31 DIAGNOSIS — Z79.899 HIGH RISK MEDICATION USE: ICD-10-CM

## 2023-10-31 DIAGNOSIS — E53.8 VITAMIN B12 DEFICIENCY: ICD-10-CM

## 2023-10-31 DIAGNOSIS — I48.91 ATRIAL FIBRILLATION, UNSPECIFIED TYPE (MULTI): Primary | ICD-10-CM

## 2023-10-31 DIAGNOSIS — I10 HYPERTENSION, UNSPECIFIED TYPE: ICD-10-CM

## 2023-10-31 DIAGNOSIS — N40.1 BENIGN PROSTATIC HYPERPLASIA WITH LOWER URINARY TRACT SYMPTOMS, SYMPTOM DETAILS UNSPECIFIED: ICD-10-CM

## 2023-10-31 DIAGNOSIS — Z00.00 ROUTINE GENERAL MEDICAL EXAMINATION AT HEALTH CARE FACILITY: ICD-10-CM

## 2023-10-31 PROCEDURE — G0444 DEPRESSION SCREEN ANNUAL: HCPCS | Performed by: INTERNAL MEDICINE

## 2023-10-31 PROCEDURE — 1159F MED LIST DOCD IN RCRD: CPT | Performed by: INTERNAL MEDICINE

## 2023-10-31 PROCEDURE — 99214 OFFICE O/P EST MOD 30 MIN: CPT | Performed by: INTERNAL MEDICINE

## 2023-10-31 PROCEDURE — 3074F SYST BP LT 130 MM HG: CPT | Performed by: INTERNAL MEDICINE

## 2023-10-31 PROCEDURE — 3078F DIAST BP <80 MM HG: CPT | Performed by: INTERNAL MEDICINE

## 2023-10-31 PROCEDURE — G0439 PPPS, SUBSEQ VISIT: HCPCS | Performed by: INTERNAL MEDICINE

## 2023-10-31 PROCEDURE — 1160F RVW MEDS BY RX/DR IN RCRD: CPT | Performed by: INTERNAL MEDICINE

## 2023-10-31 PROCEDURE — 1126F AMNT PAIN NOTED NONE PRSNT: CPT | Performed by: INTERNAL MEDICINE

## 2023-10-31 PROCEDURE — 1170F FXNL STATUS ASSESSED: CPT | Performed by: INTERNAL MEDICINE

## 2023-10-31 PROCEDURE — 1036F TOBACCO NON-USER: CPT | Performed by: INTERNAL MEDICINE

## 2023-10-31 ASSESSMENT — ENCOUNTER SYMPTOMS
DIFFICULTY URINATING: 0
OCCASIONAL FEELINGS OF UNSTEADINESS: 1
ARTHRALGIAS: 0
DEPRESSION: 0
FEVER: 0
SINUS PAIN: 0
LOSS OF SENSATION IN FEET: 0
ABDOMINAL PAIN: 0
WHEEZING: 0
UNEXPECTED WEIGHT CHANGE: 0
DIARRHEA: 0
HEADACHES: 0
BLOOD IN STOOL: 0
PALPITATIONS: 0
SORE THROAT: 0
COUGH: 0
DIZZINESS: 0
FATIGUE: 0
BRUISES/BLEEDS EASILY: 0

## 2023-10-31 ASSESSMENT — ACTIVITIES OF DAILY LIVING (ADL)
GROCERY_SHOPPING: TOTAL CARE
BATHING: NEEDS ASSISTANCE
MANAGING_FINANCES: TOTAL CARE
DOING_HOUSEWORK: TOTAL CARE
DRESSING: INDEPENDENT
TAKING_MEDICATION: INDEPENDENT

## 2023-10-31 ASSESSMENT — PATIENT HEALTH QUESTIONNAIRE - PHQ9
SUM OF ALL RESPONSES TO PHQ9 QUESTIONS 1 AND 2: 3
1. LITTLE INTEREST OR PLEASURE IN DOING THINGS: MORE THAN HALF THE DAYS
2. FEELING DOWN, DEPRESSED OR HOPELESS: SEVERAL DAYS

## 2023-10-31 NOTE — PROGRESS NOTES
Subjective   Reason for Visit: Jayjay García is an 87 y.o. male here for a Medicare Wellness visit.     Past Medical, Surgical, and Family History reviewed and updated in chart.    Reviewed all medications by prescribing practitioner or clinical pharmacist (such as prescriptions, OTCs, herbal therapies and supplements) and documented in the medical record.    Annual preventive visit  - Vaccinations  Given new vaccine for flu vaccine today  Need to proceed with RSV and COVID booster as recommended  - Screening for colon cancer obtained no further needs at this time with patient age asymptomatic  - Screening for depression  I spent 15 minutes obtaining and discussing depression screening using PHQ-2 questions with results documented in the chart.    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, -History partly obtained from wife, patient is hard of hearing    -Recurrent left pleural effusion compensated no recurrence  -- s/p multiple taps, s/p Pleurx placement, removed 6/2021 no recurrence  -Status post abdominal aneurysm repair, Infrarenal AAA doing well follow-up vascular surgery as recommended  -Laryngeal cancer and vocal cord partial resection doing well no recurrence follow-up otolaryngology  - Diabetes diet controlled last hemoglobin A1c 6.2  - Atherosclerotic artery disease compensated  - Hypertension controlled  - Benign prostatic hypertrophy symptoms are controlled continue with tamsulosin  -Need to proceed with blood work  Reevaluate patient in 3 months  Counseled about diet controlled exercise multivitamins             Patient Care Team:  Lisa Nichols MD as PCP - General (Internal Medicine)  Nino Dela Cruz [facility] PCP [facility]     Review of Systems   Constitutional:  Negative for fatigue,  "fever and unexpected weight change.   HENT:  Negative for congestion, ear discharge, ear pain, mouth sores, sinus pain and sore throat.    Eyes:  Negative for visual disturbance.   Respiratory:  Negative for cough and wheezing.    Cardiovascular:  Negative for chest pain, palpitations and leg swelling.   Gastrointestinal:  Negative for abdominal pain, blood in stool and diarrhea.   Genitourinary:  Negative for difficulty urinating.   Musculoskeletal:  Negative for arthralgias.   Skin:  Negative for rash.   Neurological:  Negative for dizziness and headaches.   Hematological:  Does not bruise/bleed easily.   Psychiatric/Behavioral:  Negative for behavioral problems.    All other systems reviewed and are negative.      Objective   Vitals:  /80   Pulse 59   Temp 36.2 °C (97.1 °F)   Ht 1.727 m (5' 8\")   Wt 91.8 kg (202 lb 6.4 oz)   SpO2 98%   BMI 30.77 kg/m²     Lab Results   Component Value Date    WBC 8.0 07/31/2023    HGB 13.8 07/31/2023    HCT 43.1 07/31/2023     07/31/2023    CHOL 123 12/06/2022    TRIG 75 12/06/2022    HDL 50.0 12/06/2022    ALT 13 12/06/2022    AST 16 12/06/2022     07/31/2023    K 4.3 07/31/2023     07/31/2023    CREATININE 0.92 07/31/2023    BUN 12 07/31/2023    CO2 29 07/31/2023    TSH 0.74 07/08/2022    INR 2.10 (N) 10/05/2023    HGBA1C 6.2 07/31/2023     par   Physical Exam  Vitals and nursing note reviewed.   Constitutional:       Appearance: Normal appearance.   HENT:      Head: Normocephalic.      Nose: Nose normal.   Eyes:      Conjunctiva/sclera: Conjunctivae normal.      Pupils: Pupils are equal, round, and reactive to light.   Cardiovascular:      Rate and Rhythm: Regular rhythm.   Pulmonary:      Effort: Pulmonary effort is normal.      Breath sounds: Normal breath sounds.   Abdominal:      General: Abdomen is flat.      Palpations: Abdomen is soft.   Musculoskeletal:      Cervical back: Neck supple.   Skin:     General: Skin is warm.   Neurological:    "   General: No focal deficit present.      Mental Status: He is oriented to person, place, and time.   Psychiatric:         Mood and Affect: Mood normal.         Assessment/Plan   Problem List Items Addressed This Visit       Atrial fibrillation (CMS/HCC) - Primary    Relevant Orders    Disability Placard    Benign prostatic hyperplasia with lower urinary tract symptoms    DM (diabetes mellitus), type 2 with peripheral vascular complications (CMS/HCC)    Arteriosclerotic cardiovascular disease (ASCVD)     Other Visit Diagnoses       Screening for multiple conditions        Routine general medical examination at health care facility        High risk medication use        Relevant Orders    CBC and Auto Differential    Hypertension, unspecified type        Relevant Orders    Comprehensive Metabolic Panel    Hypercholesteremia        Relevant Orders    Lipid Panel    Other fatigue        Relevant Orders    TSH with reflex to Free T4 if abnormal    Vitamin B12 deficiency        Relevant Orders    Vitamin B12    Vitamin D deficiency, unspecified        Relevant Orders    Vitamin D 25-Hydroxy,Total (for eval of Vitamin D levels)        Annual preventive visit  - Vaccinations  Given new vaccine for flu vaccine today  Need to proceed with RSV and COVID booster as recommended  - Screening for colon cancer obtained no further needs at this time with patient age asymptomatic  - Screening for depression  I spent 15 minutes obtaining and discussing depression screening using PHQ-2 questions with results documented in the chart.    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, -History partly obtained from wife, patient is hard of hearing    -Recurrent left pleural effusion compensated no recurrence  -- s/p multiple taps, s/p Pleurx  placement, removed 6/2021 no recurrence  -Status post abdominal aneurysm repair, Infrarenal AAA doing well follow-up vascular surgery as recommended  -Laryngeal cancer and vocal cord partial resection doing well no recurrence follow-up otolaryngology  - Diabetes diet controlled last hemoglobin A1c 6.2  - Atherosclerotic artery disease compensated  - Hypertension controlled  - Benign prostatic hypertrophy symptoms are controlled continue with tamsulosin  -Need to proceed with blood work  Reevaluate patient in 3 months  Counseled about diet controlled exercise multivitamins

## 2023-10-31 NOTE — PROGRESS NOTES
"Subjective   Patient ID: Jayjay García is a 87 y.o. male who presents for Medicare Annual Wellness Visit Subsequent and Flu Vaccine (Vaccine given).    HPI       Review of Systems    Objective   Lab Results   Component Value Date    HGBA1C 6.2 07/31/2023      /68   Pulse 59   Temp 36.2 °C (97.1 °F)   Ht 1.727 m (5' 8\")   Wt 91.8 kg (202 lb 6.4 oz)   SpO2 98%   BMI 30.77 kg/m²     Physical Exam    Assessment/Plan   Jayjay was seen today for medicare annual wellness visit subsequent and flu vaccine.  Diagnoses and all orders for this visit:  Atrial fibrillation, unspecified type (CMS/HCC)  -     Disability Placard  Other orders  -     Follow Up In Primary Care - Established     "

## 2023-11-01 ENCOUNTER — OFFICE VISIT (OUTPATIENT)
Dept: PODIATRY | Facility: CLINIC | Age: 88
End: 2023-11-01
Payer: MEDICARE

## 2023-11-01 DIAGNOSIS — E11.51 DM (DIABETES MELLITUS), TYPE 2 WITH PERIPHERAL VASCULAR COMPLICATIONS (MULTI): Primary | ICD-10-CM

## 2023-11-01 DIAGNOSIS — B35.1 MYCOTIC TOENAILS: ICD-10-CM

## 2023-11-01 DIAGNOSIS — I73.9 PVD (PERIPHERAL VASCULAR DISEASE) (CMS-HCC): ICD-10-CM

## 2023-11-01 PROCEDURE — 1160F RVW MEDS BY RX/DR IN RCRD: CPT | Performed by: PODIATRIST

## 2023-11-01 PROCEDURE — 1036F TOBACCO NON-USER: CPT | Performed by: PODIATRIST

## 2023-11-01 PROCEDURE — 99212 OFFICE O/P EST SF 10 MIN: CPT | Performed by: PODIATRIST

## 2023-11-01 PROCEDURE — 1159F MED LIST DOCD IN RCRD: CPT | Performed by: PODIATRIST

## 2023-11-01 PROCEDURE — 1126F AMNT PAIN NOTED NONE PRSNT: CPT | Performed by: PODIATRIST

## 2023-11-01 NOTE — PROGRESS NOTES
This is a 87 y.o. male est patient for foot pain    History of Present Illness:     This 87 year old male presents with wife  States is unable to trim nails on own  Denies trauma  No other pedal complaints        Past Medical History  Past Medical History:   Diagnosis Date    Elevated prostate specific antigen (PSA)     Elevated prostate specific antigen (PSA)    Other conditions influencing health status     Screening for malignant neoplasms, colon    Overactive bladder     Overactive bladder    Personal history of other endocrine, nutritional and metabolic disease 10/29/2013    History of type 1 diabetes mellitus       Medications and Allergies have been reviewed.    Review Of Systems:  GENERAL: No weight loss, malaise or fevers.  HEENT: Negative for frequent or significant headaches,   RESPIRATORY: Negative for cough, wheezing or shortness of breath.  CARDIOVASCULAR: Negative for chest pain, leg swelling or palpitations.    Physical Exam:  Patient is a pleasant, cooperative, well developed 87 y.o.  adult male. The patient is alert and oriented to time, place and person.   Patient has normal affect and mood.    Examination of Both Lower Extremities:   Objective:   Vasc: DP and PT pulses are palpable bilateral 1/4.  CFT is less than 3 seconds bilateral.  Skin temperature is warm to cool proximal to distal bilateral.  Varicosities noted.     Neuro: Gross sensation intact b/l     Derm: Nails 1-5 bilateral are intact thick and discolored. NO HPK tissue noted. There are no hyperkeratoses, ulcerations, verruca or other lesions noted.      Ortho: Muscle strength is 5/5 for all pedal groups tested.      A comprehensive history and physical exam was performed. The patient was educated on clinical and radiographic findings, diagnosis, and treatment plans.    1. DM (diabetes mellitus), type 2 with peripheral vascular complications (CMS/HCC)        2. Mycotic toenails        3. PVD (peripheral vascular disease) (CMS/HCC)               Patient educated on proper diabetic foot care.  Nails 1-5 b/l were debrided in thickness and length with nail cutting forceps.  Low risk pedal concerns noted.   Patient to follow up in 6 mos or sooner if any problems arise.   Patient was in agreement to this plan. All questions answered.      Araseli Walker DPM  [phone]  Option 2  Fax: [phone]

## 2023-11-02 ENCOUNTER — LAB (OUTPATIENT)
Dept: LAB | Facility: LAB | Age: 88
End: 2023-11-02
Payer: MEDICARE

## 2023-11-02 ENCOUNTER — ANTICOAGULATION - WARFARIN VISIT (OUTPATIENT)
Dept: PHARMACY | Facility: HOSPITAL | Age: 88
End: 2023-11-02
Payer: MEDICARE

## 2023-11-02 DIAGNOSIS — E78.00 HYPERCHOLESTEREMIA: ICD-10-CM

## 2023-11-02 DIAGNOSIS — R53.83 OTHER FATIGUE: ICD-10-CM

## 2023-11-02 DIAGNOSIS — E53.8 VITAMIN B12 DEFICIENCY: ICD-10-CM

## 2023-11-02 DIAGNOSIS — Z79.899 HIGH RISK MEDICATION USE: ICD-10-CM

## 2023-11-02 DIAGNOSIS — I48.91 ATRIAL FIBRILLATION, UNSPECIFIED TYPE (MULTI): Primary | ICD-10-CM

## 2023-11-02 DIAGNOSIS — I10 HYPERTENSION, UNSPECIFIED TYPE: ICD-10-CM

## 2023-11-02 DIAGNOSIS — E55.9 VITAMIN D DEFICIENCY, UNSPECIFIED: ICD-10-CM

## 2023-11-02 LAB
25(OH)D3 SERPL-MCNC: 81 NG/ML (ref 30–100)
ALBUMIN SERPL BCP-MCNC: 4.3 G/DL (ref 3.4–5)
ALP SERPL-CCNC: 59 U/L (ref 33–136)
ALT SERPL W P-5'-P-CCNC: 9 U/L (ref 10–52)
ANION GAP SERPL CALC-SCNC: 12 MMOL/L (ref 10–20)
AST SERPL W P-5'-P-CCNC: 13 U/L (ref 9–39)
BASOPHILS # BLD AUTO: 0.03 X10*3/UL (ref 0–0.1)
BASOPHILS NFR BLD AUTO: 0.4 %
BILIRUB SERPL-MCNC: 0.5 MG/DL (ref 0–1.2)
BUN SERPL-MCNC: 16 MG/DL (ref 6–23)
CALCIUM SERPL-MCNC: 9.4 MG/DL (ref 8.6–10.3)
CHLORIDE SERPL-SCNC: 100 MMOL/L (ref 98–107)
CHOLEST SERPL-MCNC: 125 MG/DL (ref 0–199)
CHOLESTEROL/HDL RATIO: 2.1
CO2 SERPL-SCNC: 27 MMOL/L (ref 21–32)
CREAT SERPL-MCNC: 0.92 MG/DL (ref 0.5–1.3)
EOSINOPHIL # BLD AUTO: 0.19 X10*3/UL (ref 0–0.4)
EOSINOPHIL NFR BLD AUTO: 2.3 %
ERYTHROCYTE [DISTWIDTH] IN BLOOD BY AUTOMATED COUNT: 13.1 % (ref 11.5–14.5)
GFR SERPL CREATININE-BSD FRML MDRD: 81 ML/MIN/1.73M*2
GLUCOSE SERPL-MCNC: 130 MG/DL (ref 74–99)
HCT VFR BLD AUTO: 45.1 % (ref 41–52)
HDLC SERPL-MCNC: 60 MG/DL
HGB BLD-MCNC: 14.2 G/DL (ref 13.5–17.5)
IMM GRANULOCYTES # BLD AUTO: 0.03 X10*3/UL (ref 0–0.5)
IMM GRANULOCYTES NFR BLD AUTO: 0.4 % (ref 0–0.9)
LDLC SERPL CALC-MCNC: 50 MG/DL
LYMPHOCYTES # BLD AUTO: 0.78 X10*3/UL (ref 0.8–3)
LYMPHOCYTES NFR BLD AUTO: 9.3 %
MCH RBC QN AUTO: 31.5 PG (ref 26–34)
MCHC RBC AUTO-ENTMCNC: 31.5 G/DL (ref 32–36)
MCV RBC AUTO: 100 FL (ref 80–100)
MONOCYTES # BLD AUTO: 1.19 X10*3/UL (ref 0.05–0.8)
MONOCYTES NFR BLD AUTO: 14.2 %
NEUTROPHILS # BLD AUTO: 6.14 X10*3/UL (ref 1.6–5.5)
NEUTROPHILS NFR BLD AUTO: 73.4 %
NON HDL CHOLESTEROL: 65 MG/DL (ref 0–149)
NRBC BLD-RTO: 0 /100 WBCS (ref 0–0)
PLATELET # BLD AUTO: 239 X10*3/UL (ref 150–450)
POC INR: 2.1 (ref 2–3)
POC PROTHROMBIN TIME: ABNORMAL
POTASSIUM SERPL-SCNC: 4.4 MMOL/L (ref 3.5–5.3)
PROT SERPL-MCNC: 7.4 G/DL (ref 6.4–8.2)
RBC # BLD AUTO: 4.51 X10*6/UL (ref 4.5–5.9)
SODIUM SERPL-SCNC: 135 MMOL/L (ref 136–145)
TRIGL SERPL-MCNC: 73 MG/DL (ref 0–149)
TSH SERPL-ACNC: 0.81 MIU/L (ref 0.44–3.98)
VIT B12 SERPL-MCNC: 297 PG/ML (ref 211–911)
VLDL: 15 MG/DL (ref 0–40)
WBC # BLD AUTO: 8.4 X10*3/UL (ref 4.4–11.3)

## 2023-11-02 PROCEDURE — 82607 VITAMIN B-12: CPT

## 2023-11-02 PROCEDURE — 80061 LIPID PANEL: CPT

## 2023-11-02 PROCEDURE — 82306 VITAMIN D 25 HYDROXY: CPT

## 2023-11-02 PROCEDURE — 36415 COLL VENOUS BLD VENIPUNCTURE: CPT

## 2023-11-02 PROCEDURE — 85610 PROTHROMBIN TIME: CPT | Performed by: PHARMACY

## 2023-11-02 PROCEDURE — 84443 ASSAY THYROID STIM HORMONE: CPT

## 2023-11-02 PROCEDURE — 80053 COMPREHEN METABOLIC PANEL: CPT

## 2023-11-02 PROCEDURE — 85025 COMPLETE CBC W/AUTO DIFF WBC: CPT

## 2023-11-02 NOTE — PROGRESS NOTES
Subjective     Jayjay García is a 87 y.o. male who presents for anticoagulation follow up.     Location: [facility]     Referring Physician: Dr. Lisa Nichols   INR Goal: 2.0-3.0  Anticoagulation Medication: warfarin  Indication: Atrial Fibrillation/Atrial Flutter    Bleeding signs/symptoms: No  Bruising: Yes  bruise on right index finger. Unknown cause.   Major bleeding event: No  Thrombosis signs/symptoms: No  Thromboembolic event: No  Missed doses: No  Extra doses: No  Medication changes: No  Dietary changes: No  Change in health: No  Change in activity: No  Alcohol: No  drinks 2-4 glasses of homemade wine per day  Other concerns: No      Current Outpatient Medications on File Prior to Visit   Medication Sig Dispense Refill    amLODIPine-benazepriL (Lotrel) 10-40 mg capsule Take 1 capsule by mouth once daily.      aspirin 81 mg EC tablet Take by mouth.      carvedilol (Coreg) 3.125 mg tablet TAKE 1 TABLET TWICE A DAY  WITH BREAKFAST AND DINNER 180 tablet 3    cholecalciferol (Vitamin D-3) 5,000 Units tablet Take by mouth.      clotrimazole (Lotrimin) 1 % cream 2 times a day. Apply sparingly      lovastatin (Mevacor) 20 mg tablet Take 1 tablet (20 mg) by mouth once daily at bedtime.      metFORMIN (Glucophage) 1,000 mg tablet TAKE 1 TABLET DAILY WITH   FOOD 90 tablet 2    pantoprazole (ProtoNix) 40 mg EC tablet TAKE 1 TABLET BY MOUTH EVERY DAY 90 tablet 1    rosuvastatin (Crestor) 10 mg tablet Take by mouth.      tamsulosin (Flomax) 0.4 mg 24 hr capsule Take 1 capsule (0.4 mg) by mouth once daily. 90 capsule 3    warfarin (Coumadin) 4 mg tablet Take as directed base on INR value 60 tablet 11     No current facility-administered medications on file prior to visit.        Objective   INR Today: 2.1  Previous INR: 2.1    Anticoagulation Summary  As of 2023      INR goal:  2.0-3.0   TTR:  100.0 % (2.6 wk)   INR used for dosin.10 (2023)   Weekly warfarin total:  30  mg             Lab Results   Component Value Date    INR 2.10 (N) 11/02/2023    INR 2.10 (N) 10/05/2023    INR 1.90 07/03/2023    PROTIME 2.0 (A) 03/20/2023    PROTIME 22.4 (H) 03/13/2023    PROTIME CANCELED 02/28/2023    PROTIME 18.1 (H) 09/02/2022       Assessment/Plan   Current INR is Therapeutic at 2.1. Previous INR was also therapeutic at 2.1. Patient reports no major changes since previous visit. No refills needed at this time. If INR is in range at next visit, will extend follow up to every 6 weeks.     Plan:  Continue current warfarin regimen of 4 mg every day except 6 mg on Thursdays.     Follow Up: 1 month    Araseli Seaman, LianaD, BCPS

## 2023-11-08 NOTE — PROGRESS NOTES
11/08/23 1347   Activity/Group Therapy Checklist   Group Activity   Attendance Attended   Follows Direction Followed directions   Group Interactions/Observations Interacted appropriately;Alert;Sharing   Affect/Mood Range Normal range   Affect/Mood Display Appropriate   Goal Progression Progressing     Pt was very drowsy during group due to taking a Valium this morning. Pt was engaged in some parts of the group with heavy eyes. Team made aware.   INR ONLY

## 2023-11-30 ENCOUNTER — ANTICOAGULATION - WARFARIN VISIT (OUTPATIENT)
Dept: PHARMACY | Facility: HOSPITAL | Age: 88
End: 2023-11-30
Payer: MEDICARE

## 2023-11-30 DIAGNOSIS — I48.91 ATRIAL FIBRILLATION, UNSPECIFIED TYPE (MULTI): Primary | ICD-10-CM

## 2023-11-30 LAB
POC INR: 2 (ref 2–3)
POC PROTHROMBIN TIME: ABNORMAL

## 2023-11-30 PROCEDURE — 85610 PROTHROMBIN TIME: CPT | Performed by: PHARMACY

## 2023-11-30 NOTE — PROGRESS NOTES
Subjective     Jayjay García is a 88 y.o. male who presents for anticoagulation follow up.     Location: [facility]     Referring Physician: Dr Lisa Nichols  INR Goal: 2.0-3.0  Indication: Atrial Fibrillation/Atrial Flutter    Bleeding signs/symptoms: No  Bruising: No   Major bleeding event: No  Thrombosis signs/symptoms: No  Thromboembolic event: No  Missed doses: No  Extra doses: No  Medication changes: No  Dietary changes: No  Change in health: No  Change in activity: No  Alcohol: No  Other concerns: No  Upcoming Surgeries: no      Current Outpatient Medications on File Prior to Visit   Medication Sig Dispense Refill    warfarin (Coumadin) 4 mg tablet Take as directed base on INR value 60 tablet 11    amLODIPine-benazepriL (Lotrel) 10-40 mg capsule Take 1 capsule by mouth once daily.      aspirin 81 mg EC tablet Take by mouth.      carvedilol (Coreg) 3.125 mg tablet TAKE 1 TABLET TWICE A DAY  WITH BREAKFAST AND DINNER 180 tablet 3    cholecalciferol (Vitamin D-3) 5,000 Units tablet Take by mouth.      clotrimazole (Lotrimin) 1 % cream 2 times a day. Apply sparingly      lovastatin (Mevacor) 20 mg tablet Take 1 tablet (20 mg) by mouth once daily at bedtime.      metFORMIN (Glucophage) 1,000 mg tablet TAKE 1 TABLET DAILY WITH   FOOD 90 tablet 2    pantoprazole (ProtoNix) 40 mg EC tablet TAKE 1 TABLET BY MOUTH EVERY DAY 90 tablet 1    rosuvastatin (Crestor) 10 mg tablet Take by mouth.      tamsulosin (Flomax) 0.4 mg 24 hr capsule Take 1 capsule (0.4 mg) by mouth once daily. 90 capsule 3     No current facility-administered medications on file prior to visit.        Objective   Anticoagulation Summary  As of 2023      INR goal:  2.0-3.0   TTR:  100.0 % (1.5 mo)   INR used for dosin.00 (2023)   Weekly warfarin total:  30 mg             Lab Results   Component Value Date    INR 2.00 (N) 2023    INR 2.10 (N) 2023    INR 2.10 (N) 10/05/2023    PROTIME  2.0 (A) 03/20/2023    PROTIME 22.4 (H) 03/13/2023    PROTIME CANCELED 02/28/2023    PROTIME 18.1 (H) 09/02/2022       Assessment/Plan   Current INR is Therapeutic at 2.0. Previous INR was Therapeutic at 2.1. Patient reports no significant changes since last visit. Advised patient to continue current warfarin regimen of 6 mg on Thurs, and 4 mg all other days.     Follow Up:  5 weeks    Araseli Seaman, LianaD, BCPS

## 2023-12-28 DIAGNOSIS — K21.9 GASTRO-ESOPHAGEAL REFLUX DISEASE WITHOUT ESOPHAGITIS: ICD-10-CM

## 2023-12-28 RX ORDER — PANTOPRAZOLE SODIUM 40 MG/1
40 TABLET, DELAYED RELEASE ORAL DAILY
Qty: 90 TABLET | Refills: 1 | Status: SHIPPED | OUTPATIENT
Start: 2023-12-28

## 2024-01-04 ENCOUNTER — ANTICOAGULATION - WARFARIN VISIT (OUTPATIENT)
Dept: PHARMACY | Facility: HOSPITAL | Age: 89
End: 2024-01-04
Payer: MEDICARE

## 2024-01-04 DIAGNOSIS — I48.91 ATRIAL FIBRILLATION, UNSPECIFIED TYPE (MULTI): Primary | ICD-10-CM

## 2024-01-04 LAB
POC INR: 1.6 (ref 2–3)
POC PROTHROMBIN TIME: ABNORMAL

## 2024-01-04 PROCEDURE — 85610 PROTHROMBIN TIME: CPT | Performed by: PHARMACY

## 2024-01-04 NOTE — PROGRESS NOTES
Subjective     Jayjay García is a 88 y.o. male who presents for anticoagulation follow up.     Location: [facility]     Referring Physician: Dr. Lisa Stephen  INR Goal: 2.0-3.0  Indication: Atrial Fibrillation/Atrial Flutter    Bleeding signs/symptoms: No  Bruising: No   Major bleeding event: No  Thrombosis signs/symptoms: No  Thromboembolic event: No  Missed doses: No  Extra doses: No  Medication changes: No  Dietary changes: Yes  less alcohol; planning to continue reduced alcohol intake  Change in health: No  Change in activity: No  Alcohol: No  Other concerns: No  Upcoming Surgeries: no      Current Outpatient Medications on File Prior to Visit   Medication Sig Dispense Refill    amLODIPine-benazepriL (Lotrel) 10-40 mg capsule Take 1 capsule by mouth once daily.      aspirin 81 mg EC tablet Take by mouth.      carvedilol (Coreg) 3.125 mg tablet TAKE 1 TABLET TWICE A DAY  WITH BREAKFAST AND DINNER 180 tablet 3    cholecalciferol (Vitamin D-3) 5,000 Units tablet Take by mouth.      clotrimazole (Lotrimin) 1 % cream 2 times a day. Apply sparingly      lovastatin (Mevacor) 20 mg tablet Take 1 tablet (20 mg) by mouth once daily at bedtime.      metFORMIN (Glucophage) 1,000 mg tablet TAKE 1 TABLET DAILY WITH   FOOD 90 tablet 2    pantoprazole (ProtoNix) 40 mg EC tablet Take 1 tablet (40 mg) by mouth once daily. Do not crush, chew, or split. 90 tablet 1    rosuvastatin (Crestor) 10 mg tablet Take by mouth.      tamsulosin (Flomax) 0.4 mg 24 hr capsule Take 1 capsule (0.4 mg) by mouth once daily. 90 capsule 3    warfarin (Coumadin) 4 mg tablet Take as directed base on INR value 60 tablet 11    [DISCONTINUED] pantoprazole (ProtoNix) 40 mg EC tablet TAKE 1 TABLET BY MOUTH EVERY DAY 90 tablet 1     No current facility-administered medications on file prior to visit.        Objective   Anticoagulation Summary  As of 1/4/2024      INR goal:  2.0-3.0   TTR:  57.0 % (2.7 mo)   INR used for  dosin.60 (2024)   Weekly warfarin total:  32 mg             Lab Results   Component Value Date    INR 1.60 (A) 2024    INR 2.00 (N) 2023    INR 2.10 (N) 2023    PROTIME 2.0 (A) 2023    PROTIME 22.4 (H) 2023    PROTIME CANCELED 2023    PROTIME 18.1 (H) 2022       Assessment/Plan   Current INR is Subtherapeutic at 1.6. Previous INR was Therapeutic at 2.0.  Patient reported reduced alcohol intake over the past few weeks. Makes his own wine and will not be making more until April. Until that time, patient is planning to reduce alcohol intake. Advised patient to increase current warfarin regimen to 6 mg on Tues/Thurs and 4 mg all other days.     Follow Up: 2 weeks    Araseli Seaman, LianaD

## 2024-01-17 ENCOUNTER — PROCEDURE VISIT (OUTPATIENT)
Dept: PODIATRY | Facility: CLINIC | Age: 89
End: 2024-01-17
Payer: MEDICARE

## 2024-01-17 DIAGNOSIS — B35.1 MYCOTIC TOENAILS: ICD-10-CM

## 2024-01-17 DIAGNOSIS — I73.9 PVD (PERIPHERAL VASCULAR DISEASE) (CMS-HCC): ICD-10-CM

## 2024-01-17 DIAGNOSIS — E11.51 DM (DIABETES MELLITUS), TYPE 2 WITH PERIPHERAL VASCULAR COMPLICATIONS (MULTI): Primary | ICD-10-CM

## 2024-01-17 PROCEDURE — 99212 OFFICE O/P EST SF 10 MIN: CPT | Performed by: PODIATRIST

## 2024-01-17 NOTE — PROGRESS NOTES
This is a 87 y.o. male est patient for foot pain    History of Present Illness:     This 87 year old male presents with wife  States is unable to trim nails on own  Denies trauma  No other pedal complaints    Past Medical History  Past Medical History:   Diagnosis Date    Elevated prostate specific antigen (PSA)     Elevated prostate specific antigen (PSA)    Other conditions influencing health status     Screening for malignant neoplasms, colon    Overactive bladder     Overactive bladder    Personal history of other endocrine, nutritional and metabolic disease 10/29/2013    History of type 1 diabetes mellitus       Medications and Allergies have been reviewed.    Review Of Systems:  GENERAL: No weight loss, malaise or fevers.  HEENT: Negative for frequent or significant headaches,   RESPIRATORY: Negative for cough, wheezing or shortness of breath.  CARDIOVASCULAR: Negative for chest pain, leg swelling or palpitations.    Examination of Both Lower Extremities:   Objective:   Vasc: DP and PT pulses are palpable bilateral 1/4.  CFT is less than 3 seconds bilateral.  Skin temperature is warm to cool proximal to distal bilateral.  Varicosities noted.     Neuro: Gross sensation intact b/l     Derm: Nails 1-5 bilateral are intact thick and discolored. NO HPK tissue noted. There are no hyperkeratoses, ulcerations, verruca or other lesions noted.      Ortho: Muscle strength is 5/5 for all pedal groups tested.      1. DM (diabetes mellitus), type 2 with peripheral vascular complications (CMS/HCC)        2. Mycotic toenails        3. PVD (peripheral vascular disease) (CMS/[facility])              Patient educated on proper diabetic foot care.  Nails 1-5 b/l were debrided in thickness and length with nail cutting forceps.  Low risk pedal concerns noted.   Patient to follow up in 6 mos or sooner if any problems arise.   Patient was in agreement to this plan. All questions answered.  Family ok to trim and file down nails.     Araseli  EDEN Walker  [phone]  [address]  Fax: [phone]

## 2024-01-18 ENCOUNTER — ANTICOAGULATION - WARFARIN VISIT (OUTPATIENT)
Dept: PHARMACY | Facility: HOSPITAL | Age: 89
End: 2024-01-18
Payer: MEDICARE

## 2024-01-18 DIAGNOSIS — I48.91 ATRIAL FIBRILLATION, UNSPECIFIED TYPE (MULTI): Primary | ICD-10-CM

## 2024-01-18 LAB
POC INR: 2.1 (ref 2–3)
POC PROTHROMBIN TIME: ABNORMAL

## 2024-01-18 PROCEDURE — 85610 PROTHROMBIN TIME: CPT | Performed by: PHARMACY

## 2024-01-18 NOTE — PROGRESS NOTES
Subjective     Jayjay García is a 88 y.o. male who presents for anticoagulation follow up.     Location: [facility]     Referring Provider: Dr. Lisa Nichols  INR Goal: 2.0-3.0  Indication: Atrial Fibrillation/Atrial Flutter    Bleeding signs/symptoms: No  Bruising: No   Major bleeding event: No  Thrombosis signs/symptoms: No  Thromboembolic event: No  Missed doses: No  Extra doses: No  Medication changes: No  Dietary changes: No  Change in health: No  Change in activity: No  Alcohol: No  Other concerns: No  Upcoming Surgeries: no      Current Outpatient Medications on File Prior to Visit   Medication Sig Dispense Refill    amLODIPine-benazepriL (Lotrel) 10-40 mg capsule Take 1 capsule by mouth once daily.      aspirin 81 mg EC tablet Take by mouth.      carvedilol (Coreg) 3.125 mg tablet TAKE 1 TABLET TWICE A DAY  WITH BREAKFAST AND DINNER 180 tablet 3    cholecalciferol (Vitamin D-3) 5,000 Units tablet Take by mouth.      clotrimazole (Lotrimin) 1 % cream 2 times a day. Apply sparingly      lovastatin (Mevacor) 20 mg tablet Take 1 tablet (20 mg) by mouth once daily at bedtime.      metFORMIN (Glucophage) 1,000 mg tablet TAKE 1 TABLET DAILY WITH   FOOD 90 tablet 2    pantoprazole (ProtoNix) 40 mg EC tablet Take 1 tablet (40 mg) by mouth once daily. Do not crush, chew, or split. 90 tablet 1    rosuvastatin (Crestor) 10 mg tablet Take by mouth.      tamsulosin (Flomax) 0.4 mg 24 hr capsule Take 1 capsule (0.4 mg) by mouth once daily. 90 capsule 3    warfarin (Coumadin) 4 mg tablet Take as directed base on INR value 60 tablet 11     No current facility-administered medications on file prior to visit.        Objective   Anticoagulation Summary  As of 2024      INR goal:  2.0-3.0   TTR:  51.5 % (3.2 mo)   INR used for dosin.10 (2024)   Weekly warfarin total:  32 mg             Lab Results   Component Value Date    INR 2.10 (N) 2024    INR 1.60 (A) 2024     INR 2.00 (N) 11/30/2023    PROTIME 2.0 (A) 03/20/2023    PROTIME 22.4 (H) 03/13/2023    PROTIME CANCELED 02/28/2023    PROTIME 18.1 (H) 09/02/2022       Assessment/Plan   Current INR is Therapeutic at 2.1. Previous INR was Subtherapeutic at 1.6. Patient denies any changes in diet/medications. Advised patient to continue current warfarin regimen of 6 mg on Tues/Thurs and 4 mg all other days.     Follow Up: 1 month    Araseli Seaman, LianaD

## 2024-02-15 ENCOUNTER — ANTICOAGULATION - WARFARIN VISIT (OUTPATIENT)
Dept: PHARMACY | Facility: HOSPITAL | Age: 89
End: 2024-02-15
Payer: MEDICARE

## 2024-02-15 DIAGNOSIS — I48.91 ATRIAL FIBRILLATION, UNSPECIFIED TYPE (MULTI): Primary | ICD-10-CM

## 2024-02-15 LAB
POC INR: 2.3 (ref 2–3)
POC PROTHROMBIN TIME: ABNORMAL

## 2024-02-15 NOTE — PROGRESS NOTES
Subjective     Jayjay García is a 88 y.o. male who presents for anticoagulation follow up.     Location: [facility]     Referring Provider: Dr. Lisa Nichols  INR Goal: 2.0-3.0  Indication: Atrial Fibrillation/Atrial Flutter    Bleeding signs/symptoms: No  Bruising: No   Major bleeding event: No  Thrombosis signs/symptoms: No  Thromboembolic event: No  Missed doses: No  Extra doses: No  Medication changes: No  Dietary changes: No  Change in health: No  Change in activity: No  Alcohol: No  Other concerns: No  Upcoming Surgeries: no  Injections: no    Current Outpatient Medications on File Prior to Visit   Medication Sig Dispense Refill    warfarin (Coumadin) 4 mg tablet Take as directed base on INR value 60 tablet 11    amLODIPine-benazepriL (Lotrel) 10-40 mg capsule Take 1 capsule by mouth once daily.      aspirin 81 mg EC tablet Take by mouth.      carvedilol (Coreg) 3.125 mg tablet TAKE 1 TABLET TWICE A DAY  WITH BREAKFAST AND DINNER 180 tablet 3    cholecalciferol (Vitamin D-3) 5,000 Units tablet Take by mouth.      clotrimazole (Lotrimin) 1 % cream 2 times a day. Apply sparingly      lovastatin (Mevacor) 20 mg tablet Take 1 tablet (20 mg) by mouth once daily at bedtime.      metFORMIN (Glucophage) 1,000 mg tablet TAKE 1 TABLET DAILY WITH   FOOD 90 tablet 2    pantoprazole (ProtoNix) 40 mg EC tablet Take 1 tablet (40 mg) by mouth once daily. Do not crush, chew, or split. 90 tablet 1    rosuvastatin (Crestor) 10 mg tablet Take by mouth.      tamsulosin (Flomax) 0.4 mg 24 hr capsule Take 1 capsule (0.4 mg) by mouth once daily. 90 capsule 3     No current facility-administered medications on file prior to visit.        Objective   Anticoagulation Summary  As of 2/15/2024      INR goal:  2.0-3.0   TTR:  62.4 % (4.1 mo)   INR used for dosin.30 (2/15/2024)   Weekly warfarin total:  32 mg             Lab Results   Component Value Date    INR 2.30 (N) 02/15/2024    INR 2.10 (N)  01/18/2024    INR 1.60 (A) 01/04/2024    PROTIME 2.0 (A) 03/20/2023    PROTIME 22.4 (H) 03/13/2023    PROTIME CANCELED 02/28/2023    PROTIME 18.1 (H) 09/02/2022       Assessment/Plan   Current INR is Therapeutic at 2.3. Previous INR was Therapeutic at 2.1. No changes reported from previous visit. Advised patient to continue current warfarin regimen of 6 mg on Tue/Thur and 4 mg all other days. No refills needed at this time.     Follow Up: 1 month    Araseli Seaman, LianaD

## 2024-02-20 ENCOUNTER — OFFICE VISIT (OUTPATIENT)
Dept: PRIMARY CARE | Facility: CLINIC | Age: 89
End: 2024-02-20
Payer: MEDICARE

## 2024-02-20 VITALS
OXYGEN SATURATION: 97 % | HEART RATE: 83 BPM | DIASTOLIC BLOOD PRESSURE: 60 MMHG | WEIGHT: 202.2 LBS | BODY MASS INDEX: 30.74 KG/M2 | SYSTOLIC BLOOD PRESSURE: 132 MMHG | TEMPERATURE: 97.1 F

## 2024-02-20 DIAGNOSIS — Z86.79 S/P AAA REPAIR: ICD-10-CM

## 2024-02-20 DIAGNOSIS — E11.51 DM (DIABETES MELLITUS), TYPE 2 WITH PERIPHERAL VASCULAR COMPLICATIONS (MULTI): ICD-10-CM

## 2024-02-20 DIAGNOSIS — I10 HYPERTENSION, UNSPECIFIED TYPE: ICD-10-CM

## 2024-02-20 DIAGNOSIS — I48.91 ATRIAL FIBRILLATION, UNSPECIFIED TYPE (MULTI): ICD-10-CM

## 2024-02-20 DIAGNOSIS — E78.00 HYPERCHOLESTEREMIA: ICD-10-CM

## 2024-02-20 DIAGNOSIS — C32.9 LARYNGEAL CANCER (MULTI): ICD-10-CM

## 2024-02-20 DIAGNOSIS — E11.9: Primary | ICD-10-CM

## 2024-02-20 DIAGNOSIS — I11.0 HYPERTENSIVE HEART DISEASE WITH HEART FAILURE (MULTI): ICD-10-CM

## 2024-02-20 DIAGNOSIS — I48.11 LONGSTANDING PERSISTENT ATRIAL FIBRILLATION (MULTI): ICD-10-CM

## 2024-02-20 DIAGNOSIS — I10 PRIMARY HYPERTENSION: ICD-10-CM

## 2024-02-20 DIAGNOSIS — J44.9 CHRONIC OBSTRUCTIVE PULMONARY DISEASE, UNSPECIFIED COPD TYPE (MULTI): ICD-10-CM

## 2024-02-20 DIAGNOSIS — Z98.890 S/P AAA REPAIR: ICD-10-CM

## 2024-02-20 LAB
POC FINGERSTICK BLOOD GLUCOSE: 129 MG/DL (ref 70–100)
POC HEMOGLOBIN A1C: 6.2 % (ref 4.2–6.5)

## 2024-02-20 PROCEDURE — 1157F ADVNC CARE PLAN IN RCRD: CPT | Performed by: INTERNAL MEDICINE

## 2024-02-20 PROCEDURE — 1160F RVW MEDS BY RX/DR IN RCRD: CPT | Performed by: INTERNAL MEDICINE

## 2024-02-20 PROCEDURE — 1036F TOBACCO NON-USER: CPT | Performed by: INTERNAL MEDICINE

## 2024-02-20 PROCEDURE — 99214 OFFICE O/P EST MOD 30 MIN: CPT | Performed by: INTERNAL MEDICINE

## 2024-02-20 PROCEDURE — 83036 HEMOGLOBIN GLYCOSYLATED A1C: CPT | Performed by: INTERNAL MEDICINE

## 2024-02-20 PROCEDURE — 1126F AMNT PAIN NOTED NONE PRSNT: CPT | Performed by: INTERNAL MEDICINE

## 2024-02-20 PROCEDURE — 3078F DIAST BP <80 MM HG: CPT | Performed by: INTERNAL MEDICINE

## 2024-02-20 PROCEDURE — 3075F SYST BP GE 130 - 139MM HG: CPT | Performed by: INTERNAL MEDICINE

## 2024-02-20 PROCEDURE — 82962 GLUCOSE BLOOD TEST: CPT | Performed by: INTERNAL MEDICINE

## 2024-02-20 PROCEDURE — 1159F MED LIST DOCD IN RCRD: CPT | Performed by: INTERNAL MEDICINE

## 2024-02-20 RX ORDER — CARVEDILOL 3.12 MG/1
3.12 TABLET ORAL
Qty: 180 TABLET | Refills: 1 | Status: SHIPPED | OUTPATIENT
Start: 2024-02-20 | End: 2024-02-20 | Stop reason: SDUPTHER

## 2024-02-20 RX ORDER — CARVEDILOL 3.12 MG/1
3.12 TABLET ORAL
Qty: 180 TABLET | Refills: 1 | Status: SHIPPED | OUTPATIENT
Start: 2024-02-20

## 2024-02-20 RX ORDER — AMLODIPINE AND BENAZEPRIL HYDROCHLORIDE 10; 40 MG/1; MG/1
1 CAPSULE ORAL DAILY
Qty: 90 CAPSULE | Refills: 3 | Status: SHIPPED | OUTPATIENT
Start: 2024-02-20 | End: 2025-02-19

## 2024-02-20 ASSESSMENT — ENCOUNTER SYMPTOMS
DIARRHEA: 0
FATIGUE: 0
ABDOMINAL PAIN: 0
SINUS PAIN: 0
PALPITATIONS: 0
BLOOD IN STOOL: 0
WHEEZING: 0
HEADACHES: 0
ARTHRALGIAS: 0
COUGH: 0
DIFFICULTY URINATING: 0
FEVER: 0
DIZZINESS: 0
BRUISES/BLEEDS EASILY: 0
SORE THROAT: 0
UNEXPECTED WEIGHT CHANGE: 0

## 2024-02-20 NOTE — PROGRESS NOTES
Subjective   Patient ID: Jayjay García is a 88 y.o. male who presents for Diabetes (A1C, random) and Atrial Fibrillation.    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,  Doing well no swallowing problems  - -Recurrent left pleural effusion compensated no recurrence  s/p multiple taps, s/p Pleurx placement, removed 6/2021 no recurrence  -Status post abdominal aneurysm repair, Infrarenal AAA doing well follow-up vascular surgery as recommended no abdominal pain  - Diabetes diet controlled last hemoglobin A1c 6.2 good blood sugar  - Atherosclerotic artery disease compensated  - Hypertension controlled  - Benign prostatic hypertrophy symptoms are controlled continue with tamsulosin  -Follow-up 3 months    Diabetes  Pertinent negatives for hypoglycemia include no dizziness or headaches. Pertinent negatives for diabetes include no chest pain and no fatigue.   Atrial Fibrillation  Symptoms are negative for chest pain, dizziness and palpitations. Past medical history includes atrial fibrillation.          Review of Systems   Constitutional:  Negative for fatigue, fever and unexpected weight change.   HENT:  Negative for congestion, ear discharge, ear pain, mouth sores, sinus pain and sore throat.    Eyes:  Negative for visual disturbance.   Respiratory:  Negative for cough and wheezing.    Cardiovascular:  Negative for chest pain, palpitations and leg swelling.   Gastrointestinal:  Negative for abdominal pain,  blood in stool and diarrhea.   Genitourinary:  Negative for difficulty urinating.   Musculoskeletal:  Negative for arthralgias.   Skin:  Negative for rash.   Neurological:  Negative for dizziness and headaches.   Hematological:  Does not bruise/bleed easily.   Psychiatric/Behavioral:  Negative for behavioral problems.    All other systems reviewed and are negative.      Objective   Lab Results   Component Value Date    HGBA1C 6.2 02/20/2024      /60   Pulse 83   Temp 36.2 °C (97.1 °F)   Wt 91.7 kg (202 lb 3.2 oz)   SpO2 97%   BMI 30.74 kg/m²   Lab Results   Component Value Date    WBC 8.4 11/02/2023    HGB 14.2 11/02/2023    HCT 45.1 11/02/2023     11/02/2023    CHOL 125 11/02/2023    TRIG 73 11/02/2023    HDL 60.0 11/02/2023    ALT 9 (L) 11/02/2023    AST 13 11/02/2023     (L) 11/02/2023    K 4.4 11/02/2023     11/02/2023    CREATININE 0.92 11/02/2023    BUN 16 11/02/2023    CO2 27 11/02/2023    TSH 0.81 11/02/2023    INR 2.30 (N) 02/15/2024    HGBA1C 6.2 02/20/2024     par   Physical Exam  Vitals and nursing note reviewed.   Constitutional:       Appearance: Normal appearance.   HENT:      Head: Normocephalic.      Nose: Nose normal.   Eyes:      Conjunctiva/sclera: Conjunctivae normal.      Pupils: Pupils are equal, round, and reactive to light.   Cardiovascular:      Rate and Rhythm: Regular rhythm.   Pulmonary:      Effort: Pulmonary effort is normal.      Breath sounds: Normal breath sounds.   Abdominal:      General: Abdomen is flat.      Palpations: Abdomen is soft.   Musculoskeletal:      Cervical back: Neck supple.   Skin:     General: Skin is warm.   Neurological:      General: No focal deficit present.      Mental Status: He is oriented to person, place, and time.   Psychiatric:         Mood and Affect: Mood normal.         Assessment/Plan   Jayjay was seen today for diabetes and atrial fibrillation.  Diagnoses and all orders for this visit:  DM type 2, goal: symptom mgmt  (CMS/[facility]) (Primary)  -     POCT fingerstick glucose manually resulted  -     POCT glycosylated hemoglobin (Hb A1C) manually resulted  Primary hypertension  -     Discontinue: carvedilol (Coreg) 3.125 mg tablet; Take 1 tablet (3.125 mg) by mouth 2 times a day with meals.  -     carvedilol (Coreg) 3.125 mg tablet; Take 1 tablet (3.125 mg) by mouth 2 times a day with meals.  Laryngeal cancer (CMS/[facility])  Hypertensive heart disease with heart failure (CMS/[facility])  -     amLODIPine-benazepriL (Lotrel) 10-40 mg capsule; Take 1 capsule by mouth once daily.  Chronic obstructive pulmonary disease, unspecified COPD type (CMS/[facility])  Longstanding persistent atrial fibrillation (CMS/[facility])  DM (diabetes mellitus), type 2 with peripheral vascular complications (CMS/[facility])  S/P AAA repair  Hypertension, unspecified type  Atrial fibrillation, unspecified type (CMS/[facility])  Hypercholesteremia  Other orders  -     Follow Up In Primary Care - Established  -     Follow Up In Primary Care - Established; Future   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

## 2024-03-10 NOTE — PROGRESS NOTES
ASSESSMENT AND PLAN:   Jayjay García is a 88 y.o. male with a history of bilateral vocal cord cancer here today for followup.    Today's examination to include stroboscopy demonstrates an elevated fundamental frequency, lowest at 190 Hz. Fundamental is approximately 220-230 Hz, occasionally up to 300 Hz. This does not seem to be bothering him at all. We previously discussed his hearing loss. He does have hearing aids but does not wear them.    I would like to see the patient back in 4 months for repeat evaluation. He will see me sooner if there are any changes to his voice/airway/swallow. Currently CHATO.       Reason For Consult  Chief Complaint   Patient presents with    Post-op     HISTORY OF PRESENT ILLNESS:  Jayjay García is a 88 y.o. 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 Osborne s/p Type 3 cordectomies bilaterally. He underwent a biopsy of scar which was negative on biopsy. We discussed consideration of additional procedures to help improve his voice. He is presenting for a follow up visit with me. The patient reports doing well. He denies dysphagia and denies breathing issues.    Prior History:  Seen last on 9/11/23: 87 year old male with a history of Type 3 cordectomies bilaterally. 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 He has a pitch of 270 Hz which is appropriate considering his anterior glottic web. He denies additional concerns at this time.      He has hearing loss and was prescribed hearing aids and doesn't wish to wear them. Per his wife, additional evaluation of his hearing is warranted.     PMHx: diabetes and atrial fibrillation    Last seen on 9/11/23, with a history of Type 3 cordectomies bilaterally. 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 He denies additional concerns at this time. He has hearing loss and was prescribed hearing aids and doesn't wish to wear them. Per his wife, additional evaluation of his hearing is warranted.     Past Medical History  He has a past medical history of Elevated prostate specific antigen (PSA), Other conditions influencing health status, Overactive bladder, and Personal history of other endocrine, nutritional and metabolic disease (10/29/2013). Surgical History  He has a past surgical history that includes Cataract extraction (05/15/2013); Other surgical history (01/28/2021); Cardiac surgery (11/18/2014); CT angio abdomen pelvis w and or wo IV IV contrast (7/8/2022); CT angio abdomen pelvis w and or wo IV IV contrast (4/12/2023); and IR angiogram aorta abdomen (3/14/2023).   Social History  He reports that he has quit smoking. His smoking use included cigarettes. He has a 40.00 pack-year smoking history. He has never used smokeless tobacco. He reports current alcohol use of about 21.0 standard drinks of alcohol per week. He reports that he does not use drugs. Allergies  Patient has no known allergies.     Family History  Family History   Problem Relation Name Age of Onset    Cancer Mother      Cancer Father      Heart disease Brother         Review of Systems  All 10 systems were reviewed and negative except for above.      Last Recorded Vitals  Temperature 36.3 °C (97.3 °F), height 1.727 m (5' 8\"), weight 92.3 kg (203 lb 8 oz).    Physical Exam  ENT Physical Exam  Constitutional  Appearance: patient appears well-developed and well-nourished,  Head and Face  Appearance: head appears normal and face appears normal;  Ear  Auricles: right auricle normal; left auricle normal;  Nose  External Nose: nares patent bilaterally;  Internal Nose: nasal septal deviation (Left) present;  Oral Cavity/Oropharynx  Lips: normal;  Neck  Neck: neck normal; neck palpation " normal;  Respiratory  Inspection: no retractions visible;  Cardiovascular  Inspection: no peripheral edema present;  Neurovestibular  Mental Status: alert and oriented;  Psychiatric: mood normal;  Cranial Nerves: cranial nerves intact;       Procedures   Flexible Laryngoscopy w/ Videostroboscopy    Voice and Speech Characteristics  stGstrstastdstest:st st1st Roughness: 1  Breathiness: 1  Asthenia: 1  Strain: 2    Intelligibility: normal  Resonance: balanced  Vocal loudness: normal  Breath support: decreased    Reflux Finding Score  Subglottic edema: absent  Thick mucous: absent  Granuloma: absent  Ventricular obliteration: absent  Erythema/hyperemia: absent  IA Thickening: mild  TVC edema: none  Diffuse Laryngitis: none    Gross arytenoid movement: symmetric  Arytenoid height: normal  Supraglottic tension: lateral    Symmetry: asymmetric  Amplitude: reduced bilaterally  Phase closure: in-phase  Periodicity: periodic  Mucosal wave lateral excursion: mild restriction bilaterally  Closure: closed glottis    Additional Findings: anterior vocal cord web    Time Spent  Prep time on day of patient encounter: 10 minutes  Time spent directly with patient, family or caregiver: 15 minutes  Additional Time Spent on Patient Care Activities/Discussion with SLP re care plan: 5 minutes  Documentation Time: 10 minutes  Other Time Spent: 0 minutes  Total: 40 minutes     Scribe Attestation  By signing my name below, Jose HOANG Scribe, attest that this documentation has been prepared under the direction and in the presence of Raul Lamas MD.

## 2024-03-11 ENCOUNTER — OFFICE VISIT (OUTPATIENT)
Dept: OTOLARYNGOLOGY | Facility: HOSPITAL | Age: 89
End: 2024-03-11
Payer: MEDICARE

## 2024-03-11 VITALS — TEMPERATURE: 97.3 F | HEIGHT: 68 IN | BODY MASS INDEX: 30.84 KG/M2 | WEIGHT: 203.5 LBS

## 2024-03-11 DIAGNOSIS — R49.8 OTHER VOICE AND RESONANCE DISORDERS: ICD-10-CM

## 2024-03-11 DIAGNOSIS — C32.9 LARYNGEAL CANCER (MULTI): Primary | ICD-10-CM

## 2024-03-11 DIAGNOSIS — R49.8 VOICE FATIGUE: ICD-10-CM

## 2024-03-11 DIAGNOSIS — R49.0 MUSCLE TENSION DYSPHONIA: ICD-10-CM

## 2024-03-11 PROCEDURE — 31579 LARYNGOSCOPY TELESCOPIC: CPT | Performed by: OTOLARYNGOLOGY

## 2024-03-11 PROCEDURE — 99214 OFFICE O/P EST MOD 30 MIN: CPT | Performed by: OTOLARYNGOLOGY

## 2024-03-11 PROCEDURE — 1160F RVW MEDS BY RX/DR IN RCRD: CPT | Performed by: OTOLARYNGOLOGY

## 2024-03-11 PROCEDURE — 1157F ADVNC CARE PLAN IN RCRD: CPT | Performed by: OTOLARYNGOLOGY

## 2024-03-11 PROCEDURE — 1126F AMNT PAIN NOTED NONE PRSNT: CPT | Performed by: OTOLARYNGOLOGY

## 2024-03-11 PROCEDURE — 1159F MED LIST DOCD IN RCRD: CPT | Performed by: OTOLARYNGOLOGY

## 2024-03-11 PROCEDURE — 1036F TOBACCO NON-USER: CPT | Performed by: OTOLARYNGOLOGY

## 2024-03-11 ASSESSMENT — PATIENT HEALTH QUESTIONNAIRE - PHQ9
1. LITTLE INTEREST OR PLEASURE IN DOING THINGS: NOT AT ALL
SUM OF ALL RESPONSES TO PHQ9 QUESTIONS 1 & 2: 0
2. FEELING DOWN, DEPRESSED OR HOPELESS: NOT AT ALL

## 2024-03-14 ENCOUNTER — ANTICOAGULATION - WARFARIN VISIT (OUTPATIENT)
Dept: PHARMACY | Facility: HOSPITAL | Age: 89
End: 2024-03-14
Payer: MEDICARE

## 2024-03-14 DIAGNOSIS — I48.91 ATRIAL FIBRILLATION, UNSPECIFIED TYPE (MULTI): Primary | ICD-10-CM

## 2024-03-14 LAB
POC INR: 1.6 (ref 2–3)
POC PROTHROMBIN TIME: ABNORMAL

## 2024-03-14 NOTE — PROGRESS NOTES
Subjective     Jayjay García is a 88 y.o. male who presents for anticoagulation follow up.     Location: [facility]     Referring Provider: Dr. Lisa Nichols   INR Goal: 2.0-3.0  Indication: Atrial Fibrillation/Atrial Flutter    Bleeding signs/symptoms: No  Bruising: No   Major bleeding event: No  Thrombosis signs/symptoms: No  Thromboembolic event: No  Missed doses: No  Extra doses: No  Medication changes: No  Dietary changes: No  Change in health: No  Change in activity: No  Alcohol: Yes  Other concerns: No  Upcoming Surgeries: no  Injections: no    Current Outpatient Medications on File Prior to Visit   Medication Sig Dispense Refill    amLODIPine-benazepriL (Lotrel) 10-40 mg capsule Take 1 capsule by mouth once daily. 90 capsule 3    aspirin 81 mg EC tablet Take by mouth.      carvedilol (Coreg) 3.125 mg tablet Take 1 tablet (3.125 mg) by mouth 2 times a day with meals. 180 tablet 1    cholecalciferol (Vitamin D-3) 5,000 Units tablet Take by mouth.      lovastatin (Mevacor) 20 mg tablet Take 1 tablet (20 mg) by mouth once daily at bedtime.      metFORMIN (Glucophage) 1,000 mg tablet TAKE 1 TABLET DAILY WITH   FOOD 90 tablet 2    pantoprazole (ProtoNix) 40 mg EC tablet Take 1 tablet (40 mg) by mouth once daily. Do not crush, chew, or split. 90 tablet 1    rosuvastatin (Crestor) 10 mg tablet Take by mouth.      tamsulosin (Flomax) 0.4 mg 24 hr capsule Take 1 capsule (0.4 mg) by mouth once daily. 90 capsule 3    warfarin (Coumadin) 4 mg tablet Take as directed base on INR value 60 tablet 11     No current facility-administered medications on file prior to visit.        Objective   Anticoagulation Summary  As of 3/14/2024      INR goal:  2.0-3.0   TTR:  58.7 % (5 mo)   INR used for dosin.60 (3/14/2024)   Weekly warfarin total:  34 mg             Lab Results   Component Value Date    INR 1.60 (A) 2024    INR 2.30 (N) 02/15/2024    INR 2.10 (N) 2024    PROTIME  2.0 (A) 03/20/2023    PROTIME 22.4 (H) 03/13/2023    PROTIME CANCELED 02/28/2023    PROTIME 18.1 (H) 09/02/2022       Assessment/Plan   Current INR is Subtherapeutic at 1.6. Previous INR was Therapeutic at 2.3. Patient makes his own wine and reports that he has not been making as much recently. No other changes reported. Advised patient to take 8 mg x1 dose today, then increase current warfarin regimen to 6 mg on Tue/Thur/Sat and 4 mg all other days.     Follow Up: 2 weeks    Araseli Seaman, PharmD

## 2024-04-01 ENCOUNTER — ANTICOAGULATION - WARFARIN VISIT (OUTPATIENT)
Dept: PHARMACY | Facility: HOSPITAL | Age: 89
End: 2024-04-01
Payer: MEDICARE

## 2024-04-01 DIAGNOSIS — I48.91 ATRIAL FIBRILLATION, UNSPECIFIED TYPE (MULTI): Primary | ICD-10-CM

## 2024-04-01 LAB
POC INR: 2.3 (ref 2–3)
POC PROTHROMBIN TIME: ABNORMAL

## 2024-04-01 NOTE — PROGRESS NOTES
Subjective     Jayjay García is a 88 y.o. male who presents for anticoagulation follow up.     Location: [facility]     Referring Provider: Dr. Lisa Nichols  INR Goal: 2.0-3.0  Indication: Atrial Fibrillation/Atrial Flutter    Bleeding signs/symptoms: No  Bruising: No   Major bleeding event: No  Thrombosis signs/symptoms: No  Thromboembolic event: No  Missed doses: No  Extra doses: No  Medication changes: No  Dietary changes: No  Change in health: No  Change in activity: No  Alcohol: No  Other concerns: No  Upcoming Surgeries: no  Injections: no    Current Outpatient Medications on File Prior to Visit   Medication Sig Dispense Refill    amLODIPine-benazepriL (Lotrel) 10-40 mg capsule Take 1 capsule by mouth once daily. 90 capsule 3    aspirin 81 mg EC tablet Take by mouth.      carvedilol (Coreg) 3.125 mg tablet Take 1 tablet (3.125 mg) by mouth 2 times a day with meals. 180 tablet 1    cholecalciferol (Vitamin D-3) 5,000 Units tablet Take by mouth.      lovastatin (Mevacor) 20 mg tablet Take 1 tablet (20 mg) by mouth once daily at bedtime.      metFORMIN (Glucophage) 1,000 mg tablet TAKE 1 TABLET DAILY WITH   FOOD 90 tablet 2    pantoprazole (ProtoNix) 40 mg EC tablet Take 1 tablet (40 mg) by mouth once daily. Do not crush, chew, or split. 90 tablet 1    rosuvastatin (Crestor) 10 mg tablet Take by mouth.      tamsulosin (Flomax) 0.4 mg 24 hr capsule Take 1 capsule (0.4 mg) by mouth once daily. 90 capsule 3    warfarin (Coumadin) 4 mg tablet Take as directed base on INR value 60 tablet 11     No current facility-administered medications on file prior to visit.        Objective   Anticoagulation Summary  As of 2024      INR goal:  2.0-3.0   TTR:  57.1 % (5.6 mo)   INR used for dosin.30 (2024)   Weekly warfarin total:  34 mg             Lab Results   Component Value Date    INR 2.30 (N) 2024    INR 1.60 (A) 2024    INR 2.30 (N) 02/15/2024    PROTIME 2.0  (A) 03/20/2023    PROTIME 22.4 (H) 03/13/2023    PROTIME CANCELED 02/28/2023    PROTIME 18.1 (H) 09/02/2022       Assessment/Plan   Current INR is Therapeutic at 2.3. Previous INR was Subtherapeutic at 1.6. Patient reports no changes from previous visit. Patient reports full adherence with dose increase from previous visit. Advised patient to continue current warfarin regimen of 6 mg on Tue/Thur/Sat and 4 mg all other days.    Follow Up: 1 month    Araseli Seaman, LianaD

## 2024-04-29 ENCOUNTER — ANTICOAGULATION - WARFARIN VISIT (OUTPATIENT)
Dept: PHARMACY | Facility: HOSPITAL | Age: 89
End: 2024-04-29
Payer: MEDICARE

## 2024-04-29 DIAGNOSIS — I48.91 ATRIAL FIBRILLATION, UNSPECIFIED TYPE (MULTI): Primary | ICD-10-CM

## 2024-04-29 LAB
POC INR: 2.2 (ref 2–3)
POC PROTHROMBIN TIME: ABNORMAL

## 2024-04-29 PROCEDURE — 85610 PROTHROMBIN TIME: CPT | Performed by: PHARMACY

## 2024-04-29 NOTE — PROGRESS NOTES
I reviewed the pharmacy resident's documentation and discussed the patient with the resident. I agree with the resident medical decision making as documented in the note.     Araseli Seaman, PharmD

## 2024-04-29 NOTE — PROGRESS NOTES
Subjective     Jayjay García is a 88 y.o. male who presents for anticoagulation follow up.     Location: [facility]     Referring Provider: Dr. Lisa Nichols   INR Goal: 2.0-3.0  Indication: Atrial Fibrillation/Atrial Flutter    Bleeding signs/symptoms: No  Bruising: No   Major bleeding event: No  Thrombosis signs/symptoms: No  Thromboembolic event: No  Missed doses: No  Extra doses: No  Medication changes: No  Dietary changes: No  Change in health: No  Change in activity: No  Alcohol: No  Other concerns: No  Upcoming Surgeries: no  Parenteral anticoagulation: no    Current Outpatient Medications on File Prior to Visit   Medication Sig Dispense Refill    amLODIPine-benazepriL (Lotrel) 10-40 mg capsule Take 1 capsule by mouth once daily. 90 capsule 3    aspirin 81 mg EC tablet Take by mouth.      carvedilol (Coreg) 3.125 mg tablet Take 1 tablet (3.125 mg) by mouth 2 times a day with meals. 180 tablet 1    cholecalciferol (Vitamin D-3) 5,000 Units tablet Take by mouth.      lovastatin (Mevacor) 20 mg tablet Take 1 tablet (20 mg) by mouth once daily at bedtime.      metFORMIN (Glucophage) 1,000 mg tablet TAKE 1 TABLET DAILY WITH   FOOD 90 tablet 2    pantoprazole (ProtoNix) 40 mg EC tablet Take 1 tablet (40 mg) by mouth once daily. Do not crush, chew, or split. 90 tablet 1    rosuvastatin (Crestor) 10 mg tablet Take by mouth.      tamsulosin (Flomax) 0.4 mg 24 hr capsule Take 1 capsule (0.4 mg) by mouth once daily. 90 capsule 3    warfarin (Coumadin) 4 mg tablet Take as directed base on INR value 60 tablet 11     No current facility-administered medications on file prior to visit.        Objective   Anticoagulation Summary  As of 2024      INR goal:  2.0-3.0   TTR:  63.1% (6.6 mo)   INR used for dosin.20 (2024)   Weekly warfarin total:  34 mg             Lab Results   Component Value Date    INR 2.20 (N) 2024    INR 2.30 (N) 2024    INR 1.60 (A)  03/14/2024    PROTIME 2.0 (A) 03/20/2023    PROTIME 22.4 (H) 03/13/2023    PROTIME CANCELED 02/28/2023    PROTIME 18.1 (H) 09/02/2022       Assessment/Plan   Current INR is Therapeutic at 2.2. Previous INR was Therapeutic at 2.3. Patient reports no changes since last visit. Advised patient to continue current warfarin regimen of 6 mg on Tue/Thur/Sat and 4 mg all other days.     Follow Up: 1 month    Sally Gandara, PharmD  PGY-1 Pharmacy Resident

## 2024-05-01 ENCOUNTER — HOSPITAL ENCOUNTER (OUTPATIENT)
Dept: VASCULAR MEDICINE | Facility: CLINIC | Age: 89
Discharge: HOME | End: 2024-05-01
Payer: MEDICARE

## 2024-05-01 ENCOUNTER — APPOINTMENT (OUTPATIENT)
Dept: VASCULAR SURGERY | Facility: CLINIC | Age: 89
End: 2024-05-01
Payer: MEDICARE

## 2024-05-01 DIAGNOSIS — I71.43 INFRARENAL ABDOMINAL AORTIC ANEURYSM (AAA) WITHOUT RUPTURE (CMS-HCC): ICD-10-CM

## 2024-05-01 DIAGNOSIS — Z86.79 STATUS POST ENDOVASCULAR ANEURYSM REPAIR (EVAR): ICD-10-CM

## 2024-05-01 DIAGNOSIS — Z98.890 STATUS POST ENDOVASCULAR ANEURYSM REPAIR (EVAR): ICD-10-CM

## 2024-05-01 PROCEDURE — 93978 VASCULAR STUDY: CPT

## 2024-05-01 PROCEDURE — 93978 VASCULAR STUDY: CPT | Performed by: SURGERY

## 2024-05-08 ENCOUNTER — TELEMEDICINE (OUTPATIENT)
Dept: VASCULAR SURGERY | Facility: CLINIC | Age: 89
End: 2024-05-08
Payer: MEDICARE

## 2024-05-08 DIAGNOSIS — Z98.890 STATUS POST ENDOVASCULAR ANEURYSM REPAIR (EVAR): Primary | ICD-10-CM

## 2024-05-08 DIAGNOSIS — Z86.79 STATUS POST ENDOVASCULAR ANEURYSM REPAIR (EVAR): Primary | ICD-10-CM

## 2024-05-08 DIAGNOSIS — I71.43 INFRARENAL ABDOMINAL AORTIC ANEURYSM (AAA) WITHOUT RUPTURE (CMS-HCC): ICD-10-CM

## 2024-05-08 PROCEDURE — 1159F MED LIST DOCD IN RCRD: CPT | Performed by: NURSE PRACTITIONER

## 2024-05-08 PROCEDURE — 1160F RVW MEDS BY RX/DR IN RCRD: CPT | Performed by: NURSE PRACTITIONER

## 2024-05-08 PROCEDURE — 99443 PR PHYS/QHP TELEPHONE EVALUATION 21-30 MIN: CPT | Performed by: NURSE PRACTITIONER

## 2024-05-08 PROCEDURE — 1157F ADVNC CARE PLAN IN RCRD: CPT | Performed by: NURSE PRACTITIONER

## 2024-05-08 NOTE — PROGRESS NOTES
History Of Present Illness  Jayjay García is a 88 y.o. male presenting with a history of endovascular aneurysm repair at main [facility] in March 2023.  Patient had a postoperative CT angiogram which showed no evidence of endoleak.  He was last evaluated in the office in October 2023 and had slight enlargement in the residual aneurysm sac.  However, I discussed this with Dr. Avitia and he recommended follow-up duplex in 6 months.  He presents for a virtual visit today.  The patient was at home and I was at the [facility] facility.  Patient has chronic back pain for several months.  Otherwise, he denies any abdominal pain or back pain related to the aorta.  This was with audio capabilities and no visual capabilities.     Past Medical History  He has a past medical history of Elevated prostate specific antigen (PSA), Other conditions influencing health status, Overactive bladder, and Personal history of other endocrine, nutritional and metabolic disease (10/29/2013).    Surgical History  He has a past surgical history that includes Cataract extraction (05/15/2013); Other surgical history (01/28/2021); Cardiac surgery (11/18/2014); CT angio abdomen pelvis w and or wo IV IV contrast (7/8/2022); CT angio abdomen pelvis w and or wo IV IV contrast (4/12/2023); and IR angiogram aorta abdomen (3/14/2023).     Social History  He reports that he has quit smoking. His smoking use included cigarettes. He has a 40 pack-year smoking history. He has never used smokeless tobacco. He reports current alcohol use of about 21.0 standard drinks of alcohol per week. He reports that he does not use drugs.    Family History  Family History   Problem Relation Name Age of Onset    Cancer Mother      Cancer Father      Heart disease Brother          Allergies  Patient has no known allergies.    Review of Systems    Review of Systems     CONSTITUTIONAL: Denies weight loss, fever and chills.     HEENT: Denies changes in vision and  hearing.     RESPIRATORY: Denies SOB and cough.     CV: Denies palpitations and CP.     GI: Denies abdominal pain, nausea, vomiting and diarrhea.     : Denies dysuria and urinary frequency.     MSK: Denies myalgia and joint pain.     SKIN: Denies rash and pruritus.     VASC: Denies claudication, ischemic rest pain, or open wounds or sores     NEUROLOGICAL: Denies headache and syncope.     PSYCHIATRIC: Denies recent changes in mood. Denies anxiety and depression     Physical Exam    No physical exam was able to be completed as this was a virtual visit     Last Recorded Vitals  There were no vitals taken for this visit.    Relevant Results    Current Outpatient Medications   Medication Instructions    amLODIPine-benazepriL (Lotrel) 10-40 mg capsule 1 capsule, oral, Daily    aspirin 81 mg EC tablet oral    carvedilol (COREG) 3.125 mg, oral, 2 times daily with meals    cholecalciferol (Vitamin D-3) 5,000 Units tablet oral    lovastatin (Mevacor) 20 mg tablet 1 tablet, oral, Nightly    metFORMIN (GLUCOPHAGE) 1,000 mg, oral, Daily, Take with food.    pantoprazole (PROTONIX) 40 mg, oral, Daily, Do not crush, chew, or split.    rosuvastatin (Crestor) 10 mg tablet oral    tamsulosin (FLOMAX) 0.4 mg, oral, Daily    warfarin (Coumadin) 4 mg tablet Take as directed base on INR value           Assessment/Plan   Abdominal aortic aneurysm  Status post endovascular aneurysm repair in March 2023    I did review the patient's aortic duplex which was completed in the office 5/1/2024.  This was a technically challenging exam secondary to body habitus and overlying bowel gas.  The limbs of the endograft were noted to be patent but had diminished velocities in the left limb.  Unable to evaluate residual aneurysm sac.  Given the patient's prior change in residual aneurysm sac size, I recommend a CT angiogram abdomen and pelvis with follow-up virtually as the patient lives in [place].       Clay Hare, JAY-CNP

## 2024-05-10 ENCOUNTER — LAB (OUTPATIENT)
Dept: LAB | Facility: LAB | Age: 89
End: 2024-05-10
Payer: MEDICARE

## 2024-05-10 DIAGNOSIS — Z98.890 STATUS POST ENDOVASCULAR ANEURYSM REPAIR (EVAR): ICD-10-CM

## 2024-05-10 DIAGNOSIS — Z79.01 ANTICOAGULANT LONG-TERM USE: ICD-10-CM

## 2024-05-10 DIAGNOSIS — Z86.79 STATUS POST ENDOVASCULAR ANEURYSM REPAIR (EVAR): ICD-10-CM

## 2024-05-10 LAB
ANION GAP SERPL CALC-SCNC: 10 MMOL/L (ref 10–20)
BUN SERPL-MCNC: 15 MG/DL (ref 6–23)
CALCIUM SERPL-MCNC: 9.6 MG/DL (ref 8.6–10.3)
CHLORIDE SERPL-SCNC: 101 MMOL/L (ref 98–107)
CO2 SERPL-SCNC: 28 MMOL/L (ref 21–32)
CREAT SERPL-MCNC: 0.94 MG/DL (ref 0.5–1.3)
EGFRCR SERPLBLD CKD-EPI 2021: 78 ML/MIN/1.73M*2
GLUCOSE SERPL-MCNC: 131 MG/DL (ref 74–99)
INR PPP: 3.4 (ref 0.9–1.1)
POTASSIUM SERPL-SCNC: 4.4 MMOL/L (ref 3.5–5.3)
PROTHROMBIN TIME: 39 SECONDS (ref 9.8–12.8)
SODIUM SERPL-SCNC: 135 MMOL/L (ref 136–145)

## 2024-05-10 PROCEDURE — 36415 COLL VENOUS BLD VENIPUNCTURE: CPT

## 2024-05-15 ENCOUNTER — HOSPITAL ENCOUNTER (OUTPATIENT)
Dept: RADIOLOGY | Facility: HOSPITAL | Age: 89
Discharge: HOME | End: 2024-05-15
Payer: MEDICARE

## 2024-05-15 ENCOUNTER — PROCEDURE VISIT (OUTPATIENT)
Dept: PODIATRY | Facility: CLINIC | Age: 89
End: 2024-05-15
Payer: MEDICARE

## 2024-05-15 DIAGNOSIS — M79.672 FOOT PAIN, BILATERAL: ICD-10-CM

## 2024-05-15 DIAGNOSIS — B35.1 MYCOTIC TOENAILS: ICD-10-CM

## 2024-05-15 DIAGNOSIS — I71.43 INFRARENAL ABDOMINAL AORTIC ANEURYSM (AAA) WITHOUT RUPTURE (CMS-HCC): ICD-10-CM

## 2024-05-15 DIAGNOSIS — Z86.79 STATUS POST ENDOVASCULAR ANEURYSM REPAIR (EVAR): ICD-10-CM

## 2024-05-15 DIAGNOSIS — I73.9 PVD (PERIPHERAL VASCULAR DISEASE) (CMS-HCC): ICD-10-CM

## 2024-05-15 DIAGNOSIS — E11.51 DM (DIABETES MELLITUS), TYPE 2 WITH PERIPHERAL VASCULAR COMPLICATIONS (MULTI): Primary | ICD-10-CM

## 2024-05-15 DIAGNOSIS — Z98.890 STATUS POST ENDOVASCULAR ANEURYSM REPAIR (EVAR): ICD-10-CM

## 2024-05-15 DIAGNOSIS — M79.671 FOOT PAIN, BILATERAL: ICD-10-CM

## 2024-05-15 PROCEDURE — 74174 CTA ABD&PLVS W/CONTRAST: CPT | Performed by: RADIOLOGY

## 2024-05-15 PROCEDURE — 2550000001 HC RX 255 CONTRASTS: Performed by: NURSE PRACTITIONER

## 2024-05-15 PROCEDURE — 74174 CTA ABD&PLVS W/CONTRAST: CPT

## 2024-05-15 PROCEDURE — 11721 DEBRIDE NAIL 6 OR MORE: CPT | Performed by: PODIATRIST

## 2024-05-15 RX ADMIN — IOHEXOL 100 ML: 350 INJECTION, SOLUTION INTRAVENOUS at 09:09

## 2024-05-15 NOTE — PROGRESS NOTES
This is a 87 y.o. male est patient for foot pain    History of Present Illness:     This 87 year old male presents with wife  States is unable to trim nails on own  Denies trauma  No other pedal complaints    Past Medical History  Past Medical History:   Diagnosis Date    Elevated prostate specific antigen (PSA)     Elevated prostate specific antigen (PSA)    Other conditions influencing health status     Screening for malignant neoplasms, colon    Overactive bladder     Overactive bladder    Personal history of other endocrine, nutritional and metabolic disease 10/29/2013    History of type 1 diabetes mellitus       Medications and Allergies have been reviewed.    Review Of Systems:  GENERAL: No weight loss, malaise or fevers.  HEENT: Negative for frequent or significant headaches,   RESPIRATORY: Negative for cough, wheezing or shortness of breath.  CARDIOVASCULAR: Negative for chest pain, leg swelling or palpitations.    Examination of Both Lower Extremities:   Objective:   Vasc: DP and PT pulses are palpable bilateral 1/4.  CFT is less than 3 seconds bilateral.  Skin temperature is warm to cool proximal to distal bilateral.  Varicosities noted.     Neuro: Gross sensation intact b/l     Derm: Nails 1-5 bilateral are intact thick and discolored. NO HPK tissue noted. There are no hyperkeratoses, ulcerations, verruca or other lesions noted.      Ortho: Muscle strength is 5/5 for all pedal groups tested.      1. DM (diabetes mellitus), type 2 with peripheral vascular complications (CMS/HCC)        2. Mycotic toenails        3. PVD (peripheral vascular disease) (CMS/[facility])          Patient educated on proper diabetic foot care.  Nails 1-5 b/l were debrided in thickness and length with nail cutting forceps.  Low risk pedal concerns noted.   A1C 6.2  Patient to follow up in 6 mos or sooner if any problems arise.   Patient was in agreement to this plan. All questions answered.  Family ok to trim and file down nails.      Araseli Walker, EDEN  [phone]  Option 2  Fax: [phone]

## 2024-05-21 ENCOUNTER — OFFICE VISIT (OUTPATIENT)
Dept: PRIMARY CARE | Facility: CLINIC | Age: 89
End: 2024-05-21
Payer: MEDICARE

## 2024-05-21 VITALS
TEMPERATURE: 97.1 F | DIASTOLIC BLOOD PRESSURE: 68 MMHG | BODY MASS INDEX: 30.93 KG/M2 | OXYGEN SATURATION: 93 % | HEART RATE: 74 BPM | SYSTOLIC BLOOD PRESSURE: 110 MMHG | WEIGHT: 203.4 LBS

## 2024-05-21 DIAGNOSIS — I48.11 LONGSTANDING PERSISTENT ATRIAL FIBRILLATION (MULTI): ICD-10-CM

## 2024-05-21 DIAGNOSIS — E11.9: ICD-10-CM

## 2024-05-21 DIAGNOSIS — I48.91 ATRIAL FIBRILLATION, UNSPECIFIED TYPE (MULTI): ICD-10-CM

## 2024-05-21 DIAGNOSIS — I11.0 HYPERTENSIVE HEART DISEASE WITH HEART FAILURE (MULTI): ICD-10-CM

## 2024-05-21 DIAGNOSIS — N40.0 BENIGN PROSTATIC HYPERPLASIA, UNSPECIFIED WHETHER LOWER URINARY TRACT SYMPTOMS PRESENT: ICD-10-CM

## 2024-05-21 DIAGNOSIS — D49.2 SKIN GROWTH: Primary | ICD-10-CM

## 2024-05-21 DIAGNOSIS — E78.00 HYPERCHOLESTEREMIA: ICD-10-CM

## 2024-05-21 PROCEDURE — 1157F ADVNC CARE PLAN IN RCRD: CPT | Performed by: INTERNAL MEDICINE

## 2024-05-21 PROCEDURE — 1160F RVW MEDS BY RX/DR IN RCRD: CPT | Performed by: INTERNAL MEDICINE

## 2024-05-21 PROCEDURE — 1159F MED LIST DOCD IN RCRD: CPT | Performed by: INTERNAL MEDICINE

## 2024-05-21 PROCEDURE — 1036F TOBACCO NON-USER: CPT | Performed by: INTERNAL MEDICINE

## 2024-05-21 PROCEDURE — 99214 OFFICE O/P EST MOD 30 MIN: CPT | Performed by: INTERNAL MEDICINE

## 2024-05-21 RX ORDER — LOVASTATIN 20 MG/1
20 TABLET ORAL NIGHTLY
Qty: 90 TABLET | Refills: 1 | Status: SHIPPED | OUTPATIENT
Start: 2024-05-21

## 2024-05-21 RX ORDER — TAMSULOSIN HYDROCHLORIDE 0.4 MG/1
0.4 CAPSULE ORAL DAILY
Qty: 90 CAPSULE | Refills: 3 | Status: SHIPPED | OUTPATIENT
Start: 2024-05-21 | End: 2025-05-21

## 2024-05-21 ASSESSMENT — ENCOUNTER SYMPTOMS
FATIGUE: 0
FEVER: 0
UNEXPECTED WEIGHT CHANGE: 0
COUGH: 0
SORE THROAT: 0
ABDOMINAL PAIN: 0
BRUISES/BLEEDS EASILY: 0
PALPITATIONS: 0
ARTHRALGIAS: 0
SINUS PAIN: 0
COLOR CHANGE: 1
DIFFICULTY URINATING: 0
DIZZINESS: 0
WHEEZING: 0
HYPERTENSION: 1
BLOOD IN STOOL: 0
DIARRHEA: 0
HEADACHES: 0

## 2024-05-21 NOTE — PROGRESS NOTES
Subjective   Patient ID: Jayjay García is a 88 y.o. male who presents for Diabetes, Hypertension, and Atrial Fibrillation.    -- Left forearm skin gross possible precancerous actinic keratosis  Refer patient to dermatology  For excision and recommendation  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      Diabetes  Pertinent negatives for hypoglycemia include no dizziness or headaches. Pertinent negatives for diabetes include no chest pain and no fatigue.   Hypertension  Pertinent negatives include no chest pain, headaches or palpitations.   Atrial Fibrillation  Symptoms are negative for chest pain, dizziness and palpitations. Past medical history includes atrial fibrillation.          Review of Systems   Constitutional:  Negative for fatigue, fever and unexpected weight change.   HENT:  Negative for congestion, ear discharge, ear pain, mouth sores, sinus pain and sore throat.     Eyes:  Negative for visual disturbance.   Respiratory:  Negative for cough and wheezing.    Cardiovascular:  Negative for chest pain, palpitations and leg swelling.   Gastrointestinal:  Negative for abdominal pain, blood in stool and diarrhea.   Genitourinary:  Negative for difficulty urinating.   Musculoskeletal:  Negative for arthralgias.   Skin:  Positive for color change. Negative for rash.   Neurological:  Negative for dizziness and headaches.   Hematological:  Does not bruise/bleed easily.   Psychiatric/Behavioral:  Negative for behavioral problems.    All other systems reviewed and are negative.      Objective   Lab Results   Component Value Date    HGBA1C 6.2 02/20/2024      /68   Pulse 74   Temp 36.2 °C (97.1 °F)   Wt 92.3 kg (203 lb 6.4 oz)   SpO2 93%   BMI 30.93 kg/m²     Physical Exam  Vitals and nursing note reviewed.   Constitutional:       Appearance: Normal appearance.   HENT:      Head: Normocephalic.      Nose: Nose normal.   Eyes:      Conjunctiva/sclera: Conjunctivae normal.      Pupils: Pupils are equal, round, and reactive to light.   Cardiovascular:      Rate and Rhythm: Regular rhythm.   Pulmonary:      Effort: Pulmonary effort is normal.      Breath sounds: Normal breath sounds.   Abdominal:      General: Abdomen is flat.      Palpations: Abdomen is soft.   Musculoskeletal:      Cervical back: Neck supple.   Skin:     General: Skin is warm.      Findings: Lesion (Left forearm skin lesion) present.   Neurological:      General: No focal deficit present.      Mental Status: He is oriented to person, place, and time.   Psychiatric:         Mood and Affect: Mood normal.         Assessment/Plan   Jayjay was seen today for diabetes, hypertension and atrial fibrillation.  Diagnoses and all orders for this visit:  Skin growth (Primary)  -     Referral to Dermatology  DM type 2, goal: symptom mgmt (Multi)  Hypertensive heart disease with heart failure (Multi)  Longstanding persistent  atrial fibrillation (Multi)  Hypercholesteremia  Atrial fibrillation, unspecified type (Multi)  Other orders  -     Follow Up In Primary Care - Established; Future   -- Left forearm skin gross possible precancerous actinic keratosis  Refer patient to dermatology  For excision and recommendation  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

## 2024-06-06 ENCOUNTER — ANTICOAGULATION - WARFARIN VISIT (OUTPATIENT)
Dept: PHARMACY | Facility: HOSPITAL | Age: 89
End: 2024-06-06
Payer: MEDICARE

## 2024-06-06 ENCOUNTER — TELEPHONE (OUTPATIENT)
Dept: PRIMARY CARE | Facility: CLINIC | Age: 89
End: 2024-06-06

## 2024-06-06 DIAGNOSIS — E78.5 HYPERLIPIDEMIA, UNSPECIFIED HYPERLIPIDEMIA TYPE: ICD-10-CM

## 2024-06-06 DIAGNOSIS — I48.91 ATRIAL FIBRILLATION, UNSPECIFIED TYPE (MULTI): Primary | ICD-10-CM

## 2024-06-06 LAB
POC INR: 4.9 (ref 2–3)
POC PROTHROMBIN TIME: ABNORMAL

## 2024-06-06 NOTE — TELEPHONE ENCOUNTER
Patient's  wife stopped in to question medications.  He is actually taking rosuvastatin 10mg not lovastatin 20mg as noted in his chart.  It was changed 3/15/23 when he was discharged from the hospital and never changed in his chart.

## 2024-06-06 NOTE — PROGRESS NOTES
Subjective     Jayjay García is a 88 y.o. male who presents for anticoagulation follow up.     Location: [facility]     Referring Provider: Dr. Lisa Nichols  INR Goal: 2.0-3.0  Indication: Atrial Fibrillation/Atrial Flutter    Bleeding signs/symptoms: No  Bruising: No   Major bleeding event: No  Thrombosis signs/symptoms: No  Thromboembolic event: No  Missed doses: No  Extra doses: No  Medication changes: No  Dietary changes: No  Change in health: No  Change in activity: No  Alcohol: Yes  drinking homemade wine regularly now  Other concerns: No  Upcoming Surgeries: no  Parenteral anticoagulation: no    Current Outpatient Medications on File Prior to Visit   Medication Sig Dispense Refill    amLODIPine-benazepriL (Lotrel) 10-40 mg capsule Take 1 capsule by mouth once daily. 90 capsule 3    aspirin 81 mg EC tablet Take by mouth.      carvedilol (Coreg) 3.125 mg tablet Take 1 tablet (3.125 mg) by mouth 2 times a day with meals. 180 tablet 1    cholecalciferol (Vitamin D-3) 5,000 Units tablet Take by mouth.      lovastatin (Mevacor) 20 mg tablet Take 1 tablet (20 mg) by mouth once daily at bedtime. 90 tablet 1    metFORMIN (Glucophage) 1,000 mg tablet TAKE 1 TABLET DAILY WITH   FOOD 90 tablet 2    pantoprazole (ProtoNix) 40 mg EC tablet Take 1 tablet (40 mg) by mouth once daily. Do not crush, chew, or split. 90 tablet 1    rosuvastatin (Crestor) 10 mg tablet Take by mouth.      tamsulosin (Flomax) 0.4 mg 24 hr capsule Take 1 capsule (0.4 mg) by mouth once daily. 90 capsule 3    warfarin (Coumadin) 4 mg tablet Take as directed base on INR value 60 tablet 11     No current facility-administered medications on file prior to visit.        Objective   Anticoagulation Summary  As of 2024      INR goal:  2.0-3.0   TTR:  56.1% (7.8 mo)   INR used for dosin.90 (2024)   Weekly warfarin total:  30 mg             Lab Results   Component Value Date    INR 4.90 (A) 2024     INR 3.4 (H) 05/10/2024    INR 2.20 (N) 04/29/2024    INR 2.30 (N) 04/01/2024    PROTIME 39.0 (H) 05/10/2024    PROTIME 2.0 (A) 03/20/2023    PROTIME 22.4 (H) 03/13/2023    PROTIME CANCELED 02/28/2023    PROTIME 18.1 (H) 09/02/2022       Assessment/Plan   Current INR is Supratherapeutic at 4.9. Previous INR was Supratherapeutic at 3.4. Patient reports that he has made more wine and is drinking it regularly now. He previously ran out and was not drinking alcohol and warfarin dose was increased.   Advised patient to hold warfarin x1 dose today and reduce current warfarin regimen to 6 mg on Thursdays and 4 mg all other days.     Follow Up: 2 weeks    Araseli Seaman, PharmD

## 2024-06-20 ENCOUNTER — APPOINTMENT (OUTPATIENT)
Dept: PHARMACY | Facility: HOSPITAL | Age: 89
End: 2024-06-20
Payer: MEDICARE

## 2024-06-20 DIAGNOSIS — I48.91 ATRIAL FIBRILLATION, UNSPECIFIED TYPE (MULTI): Primary | ICD-10-CM

## 2024-06-20 LAB
POC INR: 2.3 (ref 2–3)
POC PROTHROMBIN TIME: ABNORMAL

## 2024-06-20 NOTE — PROGRESS NOTES
Subjective     Jayjay García is a 88 y.o. male who presents for anticoagulation follow up.     Location: [facility]     Referring Provider: Dr. Lisa Nichols   INR Goal: 2.0-3.0  Indication: Atrial Fibrillation/Atrial Flutter    Bleeding signs/symptoms: No  Bruising: No   Major bleeding event: No  Thrombosis signs/symptoms: No  Thromboembolic event: No  Missed doses: No  Extra doses: No  Medication changes: No  Dietary changes: No  Change in health: No  Change in activity: No  Alcohol: No  Other concerns: No  Upcoming Surgeries: no  Parenteral anticoagulation: no    Current Outpatient Medications on File Prior to Visit   Medication Sig Dispense Refill    amLODIPine-benazepriL (Lotrel) 10-40 mg capsule Take 1 capsule by mouth once daily. 90 capsule 3    aspirin 81 mg EC tablet Take by mouth.      carvedilol (Coreg) 3.125 mg tablet Take 1 tablet (3.125 mg) by mouth 2 times a day with meals. 180 tablet 1    cholecalciferol (Vitamin D-3) 5,000 Units tablet Take by mouth.      lovastatin (Mevacor) 20 mg tablet Take 1 tablet (20 mg) by mouth once daily at bedtime. 90 tablet 1    metFORMIN (Glucophage) 1,000 mg tablet TAKE 1 TABLET DAILY WITH   FOOD 90 tablet 2    pantoprazole (ProtoNix) 40 mg EC tablet Take 1 tablet (40 mg) by mouth once daily. Do not crush, chew, or split. 90 tablet 1    rosuvastatin (Crestor) 10 mg tablet Take by mouth.      tamsulosin (Flomax) 0.4 mg 24 hr capsule Take 1 capsule (0.4 mg) by mouth once daily. 90 capsule 3    warfarin (Coumadin) 4 mg tablet Take as directed base on INR value 60 tablet 11     No current facility-administered medications on file prior to visit.        Objective   Anticoagulation Summary  As of 2024      INR goal:  2.0-3.0   TTR:  54.4% (8.3 mo)   INR used for dosin.30 (2024)   Weekly warfarin total:  30 mg             Lab Results   Component Value Date    INR 2.30 (N) 2024    INR 4.90 (A) 2024    INR 3.4 (H)  05/10/2024    INR 2.20 (N) 04/29/2024    PROTIME 39.0 (H) 05/10/2024    PROTIME 2.0 (A) 03/20/2023    PROTIME 22.4 (H) 03/13/2023    PROTIME CANCELED 02/28/2023    PROTIME 18.1 (H) 09/02/2022       Assessment/Plan   Current INR is Therapeutic at 2.3. Previous INR was Supratherapeutic at 4.9. At previous visit, patient reported increase in alcohol consumption. Reduced dose at that time. INR is now within range. Advised patient to continue current warfarin regimen of 6 mg on Thursdays and 4 mg all other days.     Follow Up: 1 month    Araseli Seaman, LianaD

## 2024-07-01 DIAGNOSIS — K21.9 GASTRO-ESOPHAGEAL REFLUX DISEASE WITHOUT ESOPHAGITIS: ICD-10-CM

## 2024-07-01 RX ORDER — PANTOPRAZOLE SODIUM 40 MG/1
40 TABLET, DELAYED RELEASE ORAL DAILY
Qty: 90 TABLET | Refills: 1 | Status: SHIPPED | OUTPATIENT
Start: 2024-07-01

## 2024-07-22 ENCOUNTER — APPOINTMENT (OUTPATIENT)
Dept: PHARMACY | Facility: HOSPITAL | Age: 89
End: 2024-07-22
Payer: MEDICARE

## 2024-07-22 DIAGNOSIS — I48.91 ATRIAL FIBRILLATION, UNSPECIFIED TYPE (MULTI): Primary | ICD-10-CM

## 2024-07-22 LAB
POC INR: 2.9 (ref 2–3)
POC PROTHROMBIN TIME: ABNORMAL

## 2024-07-22 NOTE — PROGRESS NOTES
Subjective     Jayjay García is a 88 y.o. male who presents for anticoagulation follow up.     Location: [facility]     Referring Provider: Dr. Lisa Nichols   INR Goal: 2.0-3.0  Indication: Atrial Fibrillation/Atrial Flutter    Bleeding signs/symptoms: No  Bruising: No   Major bleeding event: No  Thrombosis signs/symptoms: No  Thromboembolic event: No  Missed doses: No  Extra doses: No  Medication changes: No  Dietary changes: No  Change in health: No  Change in activity: No  Alcohol: No  Other concerns: No  Upcoming Surgeries: no  Parenteral anticoagulation: no    Current Outpatient Medications on File Prior to Visit   Medication Sig Dispense Refill    amLODIPine-benazepriL (Lotrel) 10-40 mg capsule Take 1 capsule by mouth once daily. 90 capsule 3    aspirin 81 mg EC tablet Take by mouth.      carvedilol (Coreg) 3.125 mg tablet Take 1 tablet (3.125 mg) by mouth 2 times a day with meals. 180 tablet 1    cholecalciferol (Vitamin D-3) 5,000 Units tablet Take by mouth.      lovastatin (Mevacor) 20 mg tablet Take 1 tablet (20 mg) by mouth once daily at bedtime. 90 tablet 1    metFORMIN (Glucophage) 1,000 mg tablet TAKE 1 TABLET DAILY WITH   FOOD 90 tablet 2    pantoprazole (ProtoNix) 40 mg EC tablet Take 1 tablet (40 mg) by mouth once daily. Do not crush, chew, or split. 90 tablet 1    rosuvastatin (Crestor) 10 mg tablet Take by mouth.      tamsulosin (Flomax) 0.4 mg 24 hr capsule Take 1 capsule (0.4 mg) by mouth once daily. 90 capsule 3    warfarin (Coumadin) 4 mg tablet Take as directed base on INR value 60 tablet 11     No current facility-administered medications on file prior to visit.        Objective   Anticoagulation Summary  As of 2024      INR goal:  2.0-3.0   TTR:  59.6% (9.4 mo)   INR used for dosin.90 (2024)   Weekly warfarin total:  30 mg             Lab Results   Component Value Date    INR 2.90 (N) 2024    INR 2.30 (N) 2024    INR 4.90  (A) 06/06/2024    PROTIME 39.0 (H) 05/10/2024    PROTIME 2.0 (A) 03/20/2023    PROTIME 22.4 (H) 03/13/2023    PROTIME CANCELED 02/28/2023    PROTIME 18.1 (H) 09/02/2022       Assessment/Plan   Current INR is Therapeutic at 2.9. Previous INR was Therapeutic at 2.9. No changes reported from previous visit. Advised patient to continue current warfarin regimen of 6 mg on Thursdays and 4 mg all other days.     Follow Up: 1 month    Araseli Seaman, LianaD

## 2024-08-05 DIAGNOSIS — E11.9: ICD-10-CM

## 2024-08-05 RX ORDER — METFORMIN HYDROCHLORIDE 1000 MG/1
1000 TABLET ORAL DAILY
Qty: 90 TABLET | Refills: 1 | Status: SHIPPED | OUTPATIENT
Start: 2024-08-05

## 2024-08-12 ENCOUNTER — OFFICE VISIT (OUTPATIENT)
Dept: OTOLARYNGOLOGY | Facility: HOSPITAL | Age: 89
End: 2024-08-12
Payer: MEDICARE

## 2024-08-12 VITALS — HEIGHT: 68 IN | WEIGHT: 201 LBS | BODY MASS INDEX: 30.46 KG/M2

## 2024-08-12 DIAGNOSIS — Z85.21 HX OF LARYNGEAL CANCER: ICD-10-CM

## 2024-08-12 DIAGNOSIS — R49.0 MUSCLE TENSION DYSPHONIA: ICD-10-CM

## 2024-08-12 DIAGNOSIS — R49.0 VOICE HOARSENESS: Primary | ICD-10-CM

## 2024-08-12 DIAGNOSIS — R49.8 OTHER VOICE AND RESONANCE DISORDERS: ICD-10-CM

## 2024-08-12 PROCEDURE — 1157F ADVNC CARE PLAN IN RCRD: CPT | Performed by: OTOLARYNGOLOGY

## 2024-08-12 PROCEDURE — 1159F MED LIST DOCD IN RCRD: CPT | Performed by: OTOLARYNGOLOGY

## 2024-08-12 PROCEDURE — 99214 OFFICE O/P EST MOD 30 MIN: CPT | Performed by: OTOLARYNGOLOGY

## 2024-08-12 PROCEDURE — 31579 LARYNGOSCOPY TELESCOPIC: CPT | Performed by: OTOLARYNGOLOGY

## 2024-08-12 NOTE — PROGRESS NOTES
"ASSESSMENT AND PLAN:   Jayjay García is a 88 y.o. male with a history of laryngeal cancer s/p resection.     Patient evaluation finds anterior glottic web, likely due to the resection, but he is doing quite well and has no current concerns, and no findings  on strobe today.       Mild globus - conservative measures discussed.     Hearing loss - severe but will not agree to wear hearing aids.  Does not appear to warrant additional intervention at this time if he does not want to pursue any treatment.    Follow-up in 6 months.        Reason For Consult  Evaluation    Seen last 3/11/24: 88 year old male with a history of Type 3 cordectomies bilaterally. He developed an anterior commissure web that is restricting his voicing potential, increased pitch. However he does not find this bothersome. He has a pitch of 190-300 Hz which is appropriate considering his anterior glottic web. He denies additional concerns at this time.     He has hearing loss and was prescribed hearing aids and doesn't wish to wear them. Per his wife, additional evaluation of his hearing may be warranted.      HISTORY OF PRESENT ILLNESS:  Jayjay García is a 88 y.o. male presenting for a follow-up visit with me for evaluation.      The patient reports he is doing well and able to \"holler at his wife\" occasionally. His wife notes his holler is adequate.  Patient notes he regularly needs to clear his throat.      Prior History:   Last seen 3/11/24 Assessment and Plan:  Jayjay García is a 88 y.o. male with a history of bilateral vocal cord cancer here today for followup.  Today's examination to include stroboscopy demonstrates an elevated fundamental frequency, lowest at 190 Hz. Fundamental is approximately 220-230 Hz, occasionally up to 300 Hz. This does not seem to be bothering him at all. We previously discussed his hearing loss. He does have hearing aids but does not wear them.  I would like to see the patient back in 4 months for repeat " "evaluation. He will see me sooner if there are any changes to his voice/airway/swallow. Currently CHATO.    Patient is s/p microdirect laryngoscopy, diagnostic, CO2 laser (excision/ablation), biopsy, vocal fold injection of steroid and prolaryn gel on 11/2/2022, by Dr. Bhanu Morales.        Past Medical History  He has a past medical history of Elevated prostate specific antigen (PSA), Other conditions influencing health status, Overactive bladder, and Personal history of other endocrine, nutritional and metabolic disease (10/29/2013). Surgical History  He has a past surgical history that includes Cataract extraction (05/15/2013); Other surgical history (01/28/2021); Cardiac surgery (11/18/2014); CT angio abdomen pelvis w and or wo IV IV contrast (7/8/2022); CT angio abdomen pelvis w and or wo IV IV contrast (4/12/2023); and IR angiogram aorta abdomen (3/14/2023).   Social History  He reports that he has quit smoking. His smoking use included cigarettes. He has a 40 pack-year smoking history. He has never used smokeless tobacco. He reports current alcohol use of about 21.0 standard drinks of alcohol per week. He reports that he does not use drugs. Allergies  Patient has no known allergies.     Family History  Family History   Problem Relation Name Age of Onset    Cancer Mother      Cancer Father      Heart disease Brother         Review of Systems  All 10 systems were reviewed and negative except for above.      Last Recorded Vitals  Height 1.727 m (5' 8\"), weight 91.2 kg (201 lb).    Physical Exam  ENT Physical Exam   ENT Physical Exam  Constitutional  Appearance: patient appears well-developed and well-nourished,  Head and Face  Appearance: head appears normal and face appears normal;  Ear  Auricles: right auricle normal; left auricle normal;  Nose  External Nose: nares patent bilaterally; nasal cavity obstructed on the left  Oral Cavity/Oropharynx  Lips: normal; cobblestoning of oropharynx  Neck  Neck: neck " normal; neck palpation normal;  Respiratory  Inspection: no retractions visible;  Cardiovascular  Inspection: no peripheral edema present;  Neurovestibular  Mental Status: alert and oriented;  Psychiatric: mood normal;  Cranial Nerves: cranial nerves intact;     Relevant Results       Patient Reported Outcome Measures         Radiology, Laboratory and Pathology  No results found.      Procedures   Flexible Laryngoscopy w/ Videostroboscopy    VOICE AND SPEECH CHARACTERISTICS:  Normal spoken speech, (+) moderate dysphonia, (+) mild roughness, (+) mild breathiness, (+) mild asthenia, (+) moderate strain.    Intelligibility: reduced.   Resonance: balanced.   Vocal Loudness: reduced.   Breath Support: decreased.    PROCEDURE:    Indications:  Laryngeal cancer s/p resection  PROCEDURE NOTE: FLEXIBLE LARYNGOSCOPY WITH STROBOSCOPY  I recommended a flexible laryngoscopy with stroboscopy based on PE findings, and/or concern for mucosal wave details based upon history and/or for issues associated with hyperreflexic gag on mirror exam concerning for pathology. Risks, benefits, and alternatives were explained. The patient wishes to proceed and gives verbal consent.   Patient is seated in the exam chair. After adequate topical anesthesia, I advance the flexible endoscope. The examination included evaluation of the caballero, vallecula, base of tongue, pyriforms, post-cricoid area, larynx and immediate subglottis.    Reflux Finding Score  Subglottic edema: Absent   Thick Mucus: Absent   Granuloma: Absent   Ventricular Obliteration: Partial   Erythema/Hyperemia: Absent   IA Thickening: Mild   TVC Edema: Absent   Diffuse Laryngitis: Absent     Gross Arytenoid Movement: symmetric.  Arytenoid Height: normal.   Supraglottic Tension: lateral.    Symmetry: asymmetry.   Amplitude: reduced: bilateral.  Phase Closure: in-phase.  Periodicity: aperiodic.  Mucosal Wave Lateral Excursion/Secondary Wave: Bilateral Vocal Cord: moderate restriction -  Wave moved up to ¼ the width of the vocal fold.  Closure: closed.    Time Spent  Prep time on day of patient encounter: 10 minutes  Time spent directly with patient, family or caregiver: 15 minutes  Additional Time Spent on Patient Care Activities/Discussion with SLP re care plan: 5 minutes  Documentation Time: 10 minutes  Other Time Spent: 0 minutes  Total: 40 minutes     Scribe Attestation  By signing my name below, I, Martha Roy , [place]rib[place]ina   attest that this documentation has been prepared under the direction and in the presence of Raul Lamas MD.

## 2024-08-19 ENCOUNTER — APPOINTMENT (OUTPATIENT)
Dept: PHARMACY | Facility: HOSPITAL | Age: 89
End: 2024-08-19
Payer: MEDICARE

## 2024-08-19 DIAGNOSIS — I48.91 ATRIAL FIBRILLATION, UNSPECIFIED TYPE (MULTI): Primary | ICD-10-CM

## 2024-08-19 LAB
POC INR: 1.9 (ref 2–3)
POC PROTHROMBIN TIME: ABNORMAL

## 2024-08-19 NOTE — PROGRESS NOTES
Subjective     Jayjay García is a 88 y.o. male who presents for anticoagulation follow up.     Location: [facility]     Referring Provider: Dr. Lisa Nichols  INR Goal: 2.0-3.0  Indication: Atrial Fibrillation/Atrial Flutter    Bleeding signs/symptoms: No  Bruising: No   Major bleeding event: No  Thrombosis signs/symptoms: No  Thromboembolic event: No  Missed doses: No  Extra doses: No  Medication changes: No  Dietary changes: No  Change in health: No  Change in activity: No  Alcohol: No  Other concerns: No  Upcoming Surgeries: no  Parenteral anticoagulation: no    Current Outpatient Medications on File Prior to Visit   Medication Sig Dispense Refill    amLODIPine-benazepriL (Lotrel) 10-40 mg capsule Take 1 capsule by mouth once daily. 90 capsule 3    aspirin 81 mg EC tablet Take by mouth.      carvedilol (Coreg) 3.125 mg tablet Take 1 tablet (3.125 mg) by mouth 2 times a day with meals. 180 tablet 1    cholecalciferol (Vitamin D-3) 5,000 Units tablet Take by mouth.      lovastatin (Mevacor) 20 mg tablet Take 1 tablet (20 mg) by mouth once daily at bedtime. 90 tablet 1    metFORMIN (Glucophage) 1,000 mg tablet Take 1 tablet (1,000 mg) by mouth once daily. Take with food. 90 tablet 1    pantoprazole (ProtoNix) 40 mg EC tablet Take 1 tablet (40 mg) by mouth once daily. Do not crush, chew, or split. 90 tablet 1    rosuvastatin (Crestor) 10 mg tablet Take by mouth.      tamsulosin (Flomax) 0.4 mg 24 hr capsule Take 1 capsule (0.4 mg) by mouth once daily. 90 capsule 3    warfarin (Coumadin) 4 mg tablet Take as directed base on INR value 60 tablet 11     No current facility-administered medications on file prior to visit.        Objective   Anticoagulation Summary  As of 2024      INR goal:  2.0-3.0   TTR:  62.4% (10.3 mo)   INR used for dosin.90 (2024)   Weekly warfarin total:  30 mg             Lab Results   Component Value Date    INR 1.90 (A) 2024    INR 2.90  (N) 07/22/2024    INR 2.30 (N) 06/20/2024    PROTIME 39.0 (H) 05/10/2024    PROTIME 2.0 (A) 03/20/2023    PROTIME 22.4 (H) 03/13/2023    PROTIME CANCELED 02/28/2023    PROTIME 18.1 (H) 09/02/2022       Assessment/Plan   Current INR is Subtherapeutic at 1.9. Previous INR was Therapeutic at 2.9. Patient reports no change from previous visit. Advised patient to take 6 mg x1 dose today, then resume current warfarin regimen of 6 mg on Fridays and 4 mg all other days.     Follow Up:  5 weeks     Araseli Seaman, LianaD

## 2024-09-03 DIAGNOSIS — I48.11 LONGSTANDING PERSISTENT ATRIAL FIBRILLATION (MULTI): ICD-10-CM

## 2024-09-03 RX ORDER — WARFARIN 4 MG/1
TABLET ORAL
Qty: 90 TABLET | Refills: 1 | Status: SHIPPED | OUTPATIENT
Start: 2024-09-03

## 2024-09-04 ENCOUNTER — APPOINTMENT (OUTPATIENT)
Dept: PODIATRY | Facility: CLINIC | Age: 89
End: 2024-09-04
Payer: MEDICARE

## 2024-09-04 DIAGNOSIS — I73.9 PVD (PERIPHERAL VASCULAR DISEASE) (CMS-HCC): ICD-10-CM

## 2024-09-04 DIAGNOSIS — B35.1 MYCOTIC TOENAILS: ICD-10-CM

## 2024-09-04 DIAGNOSIS — E11.51 DM (DIABETES MELLITUS), TYPE 2 WITH PERIPHERAL VASCULAR COMPLICATIONS (MULTI): Primary | ICD-10-CM

## 2024-09-04 DIAGNOSIS — M79.672 FOOT PAIN, BILATERAL: ICD-10-CM

## 2024-09-04 DIAGNOSIS — M79.671 FOOT PAIN, BILATERAL: ICD-10-CM

## 2024-09-04 PROCEDURE — 99212 OFFICE O/P EST SF 10 MIN: CPT | Performed by: PODIATRIST

## 2024-09-04 NOTE — PROGRESS NOTES
This is a 88 y.o. male est patient for foot pain    History of Present Illness:   This 88 year old male presents with wife  States is unable to trim nails on own  Denies trauma  No other pedal complaints    Past Medical History  Past Medical History:   Diagnosis Date    Elevated prostate specific antigen (PSA)     Elevated prostate specific antigen (PSA)    Other conditions influencing health status     Screening for malignant neoplasms, colon    Overactive bladder     Overactive bladder    Personal history of other endocrine, nutritional and metabolic disease 10/29/2013    History of type 1 diabetes mellitus       Medications and Allergies have been reviewed.    Review Of Systems:  GENERAL: No weight loss, malaise or fevers.  HEENT: Negative for frequent or significant headaches,   RESPIRATORY: Negative for cough, wheezing or shortness of breath.  CARDIOVASCULAR: Negative for chest pain, leg swelling or palpitations.    Examination of Both Lower Extremities:   Objective:   Vasc: DP and PT pulses are palpable bilateral 1/4.  CFT is less than 3 seconds bilateral.  Skin temperature is warm to cool proximal to distal bilateral.  Varicosities noted.     Neuro: Gross sensation intact b/l     Derm: Nails 1-5 bilateral are intact, thick and discolored. NO HPK tissue noted. There are no hyperkeratoses, ulcerations, verruca or other lesions noted.      Ortho: Muscle strength is 5/5 for all pedal groups tested.      1. DM (diabetes mellitus), type 2 with peripheral vascular complications (CMS/HCC)        2. Mycotic toenails        3. PVD (peripheral vascular disease) (CMS/[facility])          Patient educated on proper diabetic foot care.  Nails 1-5 b/l were debrided in thickness and length with nail cutting forceps.  Low risk pedal concerns noted.   A1C 6.2  Patient to follow up in 6 mos or sooner if any problems arise.   Patient was in agreement to this plan. All questions answered.  Family ok to trim and file down nails.      Araseli Walker, EDEN  [phone]  [facility]  Fax: [phone]

## 2024-09-30 ENCOUNTER — APPOINTMENT (OUTPATIENT)
Dept: PHARMACY | Facility: HOSPITAL | Age: 89
End: 2024-09-30
Payer: MEDICARE

## 2024-09-30 DIAGNOSIS — I48.91 ATRIAL FIBRILLATION, UNSPECIFIED TYPE (MULTI): Primary | ICD-10-CM

## 2024-09-30 LAB
POC INR: 1.7 (ref 2–3)
POC PROTHROMBIN TIME: ABNORMAL

## 2024-09-30 PROCEDURE — 85610 PROTHROMBIN TIME: CPT | Mod: QW | Performed by: PHARMACY

## 2024-09-30 NOTE — PROGRESS NOTES
Subjective     Jayjay García is a 88 y.o. male who presents for anticoagulation follow up.     Location: [facility]     Referring Provider: Dr. Lisa Nichols   INR Goal: 2.0-3.0  Indication: Atrial Fibrillation/Atrial Flutter    Bleeding signs/symptoms: No  Bruising: No   Major bleeding event: No  Thrombosis signs/symptoms: No  Thromboembolic event: No  Missed doses: No  Extra doses: No  Medication changes: No  Dietary changes: No  Change in health: No  Change in activity: No  Alcohol: No  Other concerns: No  Upcoming Surgeries: no  Parenteral anticoagulation: no    Current Outpatient Medications on File Prior to Visit   Medication Sig Dispense Refill    amLODIPine-benazepriL (Lotrel) 10-40 mg capsule Take 1 capsule by mouth once daily. 90 capsule 3    aspirin 81 mg EC tablet Take by mouth.      carvedilol (Coreg) 3.125 mg tablet Take 1 tablet (3.125 mg) by mouth 2 times a day with meals. 180 tablet 1    cholecalciferol (Vitamin D-3) 5,000 Units tablet Take by mouth.      lovastatin (Mevacor) 20 mg tablet Take 1 tablet (20 mg) by mouth once daily at bedtime. 90 tablet 1    metFORMIN (Glucophage) 1,000 mg tablet Take 1 tablet (1,000 mg) by mouth once daily. Take with food. 90 tablet 1    pantoprazole (ProtoNix) 40 mg EC tablet Take 1 tablet (40 mg) by mouth once daily. Do not crush, chew, or split. 90 tablet 1    rosuvastatin (Crestor) 10 mg tablet Take by mouth.      tamsulosin (Flomax) 0.4 mg 24 hr capsule Take 1 capsule (0.4 mg) by mouth once daily. 90 capsule 3    warfarin (Coumadin) 4 mg tablet Take 1.5 tablets (6 mg) by mouth on  and take 1 tablet (4 mg) by mouth all other days 90 tablet 1     No current facility-administered medications on file prior to visit.        Objective   Anticoagulation Summary  As of 2024      INR goal:  2.0-3.0   TTR:  54.9% (11.7 mo)   INR used for dosin.70 (2024)   Weekly warfarin total:  32 mg             Lab Results    Component Value Date    INR 1.70 (A) 09/30/2024    INR 1.90 (A) 08/19/2024    INR 2.90 (N) 07/22/2024    PROTIME 39.0 (H) 05/10/2024    PROTIME 2.0 (A) 03/20/2023    PROTIME 22.4 (H) 03/13/2023    PROTIME CANCELED 02/28/2023    PROTIME 18.1 (H) 09/02/2022       Assessment/Plan   Current INR is Subtherapeutic at 1.7. Previous INR was Subtherapeutic at 1.9. Patient denies any changes from previous visit. No clear cause for subtherapeutic level. Advised patient to increase current warfarin regimen to 6 mg on Mon/Thur and 4 mg all other days.     Follow Up: 2 weeks    Araseli Seaman, LianaD

## 2024-10-11 DIAGNOSIS — I10 PRIMARY HYPERTENSION: ICD-10-CM

## 2024-10-14 ENCOUNTER — APPOINTMENT (OUTPATIENT)
Dept: PHARMACY | Facility: HOSPITAL | Age: 89
End: 2024-10-14
Payer: MEDICARE

## 2024-10-14 DIAGNOSIS — I48.91 ATRIAL FIBRILLATION, UNSPECIFIED TYPE (MULTI): Primary | ICD-10-CM

## 2024-10-14 LAB
POC INR: 1.8 (ref 2–3)
POC PROTHROMBIN TIME: ABNORMAL

## 2024-10-14 PROCEDURE — 85610 PROTHROMBIN TIME: CPT | Mod: QW | Performed by: PHARMACY

## 2024-10-14 RX ORDER — CARVEDILOL 3.12 MG/1
3.12 TABLET ORAL
Qty: 180 TABLET | Refills: 1 | Status: SHIPPED | OUTPATIENT
Start: 2024-10-14

## 2024-10-14 NOTE — PROGRESS NOTES
Subjective     Jayjay García is a 88 y.o. male who presents for anticoagulation follow up.     Location: [facility]     Referring Provider: Dr. Lisa Nichols  INR Goal: 2.0-3.0  Indication: Atrial Fibrillation/Atrial Flutter    Bleeding signs/symptoms: No  Bruising: No   Major bleeding event: No  Thrombosis signs/symptoms: No  Thromboembolic event: No  Missed doses: No  Extra doses: No  Medication changes: No  Dietary changes: No  Change in health: No  Change in activity: No  Alcohol: No  Other concerns: No  Upcoming Surgeries: no  Parenteral anticoagulation: no    Current Outpatient Medications on File Prior to Visit   Medication Sig Dispense Refill    amLODIPine-benazepriL (Lotrel) 10-40 mg capsule Take 1 capsule by mouth once daily. 90 capsule 3    aspirin 81 mg EC tablet Take by mouth.      carvedilol (Coreg) 3.125 mg tablet TAKE 1 TABLET TWICE DAILY  WITH MEALS 180 tablet 1    cholecalciferol (Vitamin D-3) 5,000 Units tablet Take by mouth.      lovastatin (Mevacor) 20 mg tablet Take 1 tablet (20 mg) by mouth once daily at bedtime. 90 tablet 1    metFORMIN (Glucophage) 1,000 mg tablet Take 1 tablet (1,000 mg) by mouth once daily. Take with food. 90 tablet 1    pantoprazole (ProtoNix) 40 mg EC tablet Take 1 tablet (40 mg) by mouth once daily. Do not crush, chew, or split. 90 tablet 1    rosuvastatin (Crestor) 10 mg tablet Take by mouth.      tamsulosin (Flomax) 0.4 mg 24 hr capsule Take 1 capsule (0.4 mg) by mouth once daily. 90 capsule 3    warfarin (Coumadin) 4 mg tablet Take 1.5 tablets (6 mg) by mouth on Thursdays and take 1 tablet (4 mg) by mouth all other days 90 tablet 1    [DISCONTINUED] carvedilol (Coreg) 3.125 mg tablet Take 1 tablet (3.125 mg) by mouth 2 times a day with meals. 180 tablet 1     No current facility-administered medications on file prior to visit.        Objective   Anticoagulation Summary  As of 10/14/2024      INR goal:  2.0-3.0   TTR:  52.8% (1  y)   INR used for dosin.80 (10/14/2024)   Weekly warfarin total:  34 mg             Lab Results   Component Value Date    INR 1.80 (A) 10/14/2024    INR 1.70 (A) 2024    INR 1.90 (A) 2024    PROTIME 39.0 (H) 05/10/2024    PROTIME 2.0 (A) 2023    PROTIME 22.4 (H) 2023    PROTIME CANCELED 2023    PROTIME 18.1 (H) 2022       Assessment/Plan   Current INR is Subtherapeutic at 1.8. Previous INR was Subtherapeutic at 1.7. No changes reported from previous visit. Advised patient to increase current warfarin regimen of 6 mg on // and 4 mg all other days.     Follow Up: 3 weeks    Araseli Seaman, LianaD

## 2024-11-04 ENCOUNTER — APPOINTMENT (OUTPATIENT)
Dept: PHARMACY | Facility: HOSPITAL | Age: 89
End: 2024-11-04
Payer: MEDICARE

## 2024-11-04 DIAGNOSIS — I48.91 ATRIAL FIBRILLATION, UNSPECIFIED TYPE (MULTI): Primary | ICD-10-CM

## 2024-11-04 LAB
POC INR: 2.3 (ref 2–3)
POC PROTHROMBIN TIME: ABNORMAL

## 2024-11-04 PROCEDURE — 85610 PROTHROMBIN TIME: CPT | Mod: QW | Performed by: PHARMACY

## 2024-11-04 NOTE — PROGRESS NOTES
Subjective     Jayjay García is a 88 y.o. male who presents for anticoagulation follow up.     Location: [facility]     Referring Provider: Dr. Lisa Nichols  INR Goal: 2.0-3.0  Indication: Atrial Fibrillation/Atrial Flutter    Bleeding signs/symptoms: No  Bruising: No   Major bleeding event: No  Thrombosis signs/symptoms: No  Thromboembolic event: No  Missed doses: No  Extra doses: No  Medication changes: No  Dietary changes: No  Change in health: No  Change in activity: No  Alcohol: No  Other concerns: No  Upcoming Surgeries: no  Parenteral anticoagulation: no    Current Outpatient Medications on File Prior to Visit   Medication Sig Dispense Refill    amLODIPine-benazepriL (Lotrel) 10-40 mg capsule Take 1 capsule by mouth once daily. 90 capsule 3    aspirin 81 mg EC tablet Take by mouth.      carvedilol (Coreg) 3.125 mg tablet TAKE 1 TABLET TWICE DAILY  WITH MEALS 180 tablet 1    cholecalciferol (Vitamin D-3) 5,000 Units tablet Take by mouth.      lovastatin (Mevacor) 20 mg tablet Take 1 tablet (20 mg) by mouth once daily at bedtime. 90 tablet 1    metFORMIN (Glucophage) 1,000 mg tablet Take 1 tablet (1,000 mg) by mouth once daily. Take with food. 90 tablet 1    pantoprazole (ProtoNix) 40 mg EC tablet Take 1 tablet (40 mg) by mouth once daily. Do not crush, chew, or split. 90 tablet 1    rosuvastatin (Crestor) 10 mg tablet Take by mouth.      tamsulosin (Flomax) 0.4 mg 24 hr capsule Take 1 capsule (0.4 mg) by mouth once daily. 90 capsule 3    warfarin (Coumadin) 4 mg tablet Take 1.5 tablets (6 mg) by mouth on  and take 1 tablet (4 mg) by mouth all other days 90 tablet 1     No current facility-administered medications on file prior to visit.        Objective   Anticoagulation Summary  As of 2024      INR goal:  2.0-3.0   TTR:  53.2% (1.1 y)   INR used for dosin.30 (2024)   Weekly warfarin total:  34 mg             Lab Results   Component Value Date     INR 2.30 (N) 11/04/2024    INR 1.80 (A) 10/14/2024    INR 1.70 (A) 09/30/2024    PROTIME 39.0 (H) 05/10/2024    PROTIME 2.0 (A) 03/20/2023    PROTIME 22.4 (H) 03/13/2023    PROTIME CANCELED 02/28/2023    PROTIME 18.1 (H) 09/02/2022       Assessment/Plan   Current INR is Therapeutic at 2.3. Previous INR was Subtherapeutic at 1.8. No changes from previous visit. Patient reports increasing dose as instructed at previous visit. Advised patient to continue current warfarin regimen of 6 mg on Mon/Wed/Fri and 4 mg all other days.     Follow Up: 1 month    Araseli Seaman, LianaD

## 2024-11-06 ENCOUNTER — APPOINTMENT (OUTPATIENT)
Dept: PRIMARY CARE | Facility: CLINIC | Age: 89
End: 2024-11-06
Payer: MEDICARE

## 2024-11-06 ENCOUNTER — LAB (OUTPATIENT)
Dept: LAB | Facility: LAB | Age: 89
End: 2024-11-06
Payer: MEDICARE

## 2024-11-06 VITALS
OXYGEN SATURATION: 97 % | TEMPERATURE: 94.3 F | HEART RATE: 80 BPM | BODY MASS INDEX: 32.1 KG/M2 | DIASTOLIC BLOOD PRESSURE: 64 MMHG | SYSTOLIC BLOOD PRESSURE: 132 MMHG | WEIGHT: 211.8 LBS | HEIGHT: 68 IN

## 2024-11-06 DIAGNOSIS — E55.9 VITAMIN D DEFICIENCY, UNSPECIFIED: ICD-10-CM

## 2024-11-06 DIAGNOSIS — E78.00 HYPERCHOLESTEREMIA: ICD-10-CM

## 2024-11-06 DIAGNOSIS — I48.91 ATRIAL FIBRILLATION, UNSPECIFIED TYPE (MULTI): ICD-10-CM

## 2024-11-06 DIAGNOSIS — N40.0 BENIGN PROSTATIC HYPERPLASIA, UNSPECIFIED WHETHER LOWER URINARY TRACT SYMPTOMS PRESENT: ICD-10-CM

## 2024-11-06 DIAGNOSIS — E78.5 HYPERLIPIDEMIA, UNSPECIFIED HYPERLIPIDEMIA TYPE: ICD-10-CM

## 2024-11-06 DIAGNOSIS — I10 HYPERTENSION, UNSPECIFIED TYPE: ICD-10-CM

## 2024-11-06 DIAGNOSIS — E53.8 VITAMIN B12 DEFICIENCY: ICD-10-CM

## 2024-11-06 DIAGNOSIS — E11.51 DM (DIABETES MELLITUS), TYPE 2 WITH PERIPHERAL VASCULAR COMPLICATIONS: ICD-10-CM

## 2024-11-06 DIAGNOSIS — Z00.00 ROUTINE GENERAL MEDICAL EXAMINATION AT HEALTH CARE FACILITY: Primary | ICD-10-CM

## 2024-11-06 DIAGNOSIS — E11.9: ICD-10-CM

## 2024-11-06 DIAGNOSIS — Z79.899 HIGH RISK MEDICATION USE: ICD-10-CM

## 2024-11-06 DIAGNOSIS — R53.83 OTHER FATIGUE: ICD-10-CM

## 2024-11-06 DIAGNOSIS — J44.9 CHRONIC OBSTRUCTIVE PULMONARY DISEASE, UNSPECIFIED COPD TYPE (MULTI): ICD-10-CM

## 2024-11-06 LAB
25(OH)D3 SERPL-MCNC: 53 NG/ML (ref 30–100)
ALBUMIN SERPL BCP-MCNC: 4 G/DL (ref 3.4–5)
ALP SERPL-CCNC: 72 U/L (ref 33–136)
ALT SERPL W P-5'-P-CCNC: 10 U/L (ref 10–52)
ANION GAP SERPL CALC-SCNC: 11 MMOL/L (ref 10–20)
AST SERPL W P-5'-P-CCNC: 12 U/L (ref 9–39)
BASOPHILS # BLD AUTO: 0.03 X10*3/UL (ref 0–0.1)
BASOPHILS NFR BLD AUTO: 0.3 %
BILIRUB SERPL-MCNC: 0.6 MG/DL (ref 0–1.2)
BUN SERPL-MCNC: 16 MG/DL (ref 6–23)
CALCIUM SERPL-MCNC: 9.3 MG/DL (ref 8.6–10.3)
CHLORIDE SERPL-SCNC: 102 MMOL/L (ref 98–107)
CHOLEST SERPL-MCNC: 143 MG/DL (ref 0–199)
CHOLESTEROL/HDL RATIO: 3
CO2 SERPL-SCNC: 28 MMOL/L (ref 21–32)
CREAT SERPL-MCNC: 0.92 MG/DL (ref 0.5–1.3)
EGFRCR SERPLBLD CKD-EPI 2021: 80 ML/MIN/1.73M*2
EOSINOPHIL # BLD AUTO: 0.13 X10*3/UL (ref 0–0.4)
EOSINOPHIL NFR BLD AUTO: 1.5 %
ERYTHROCYTE [DISTWIDTH] IN BLOOD BY AUTOMATED COUNT: 12.4 % (ref 11.5–14.5)
GLUCOSE SERPL-MCNC: 130 MG/DL (ref 74–99)
HCT VFR BLD AUTO: 41.3 % (ref 41–52)
HDLC SERPL-MCNC: 47 MG/DL
HGB BLD-MCNC: 13.8 G/DL (ref 13.5–17.5)
IMM GRANULOCYTES # BLD AUTO: 0.04 X10*3/UL (ref 0–0.5)
IMM GRANULOCYTES NFR BLD AUTO: 0.4 % (ref 0–0.9)
LDLC SERPL CALC-MCNC: 76 MG/DL
LYMPHOCYTES # BLD AUTO: 0.74 X10*3/UL (ref 0.8–3)
LYMPHOCYTES NFR BLD AUTO: 8.3 %
MCH RBC QN AUTO: 32.8 PG (ref 26–34)
MCHC RBC AUTO-ENTMCNC: 33.4 G/DL (ref 32–36)
MCV RBC AUTO: 98 FL (ref 80–100)
MONOCYTES # BLD AUTO: 1.02 X10*3/UL (ref 0.05–0.8)
MONOCYTES NFR BLD AUTO: 11.5 %
NEUTROPHILS # BLD AUTO: 6.94 X10*3/UL (ref 1.6–5.5)
NEUTROPHILS NFR BLD AUTO: 78 %
NON HDL CHOLESTEROL: 96 MG/DL (ref 0–149)
NRBC BLD-RTO: 0 /100 WBCS (ref 0–0)
PLATELET # BLD AUTO: 187 X10*3/UL (ref 150–450)
POC FINGERSTICK BLOOD GLUCOSE: 118 MG/DL (ref 70–100)
POC HEMOGLOBIN A1C: 6.4 % (ref 4.2–6.5)
POTASSIUM SERPL-SCNC: 4.2 MMOL/L (ref 3.5–5.3)
PROT SERPL-MCNC: 7.6 G/DL (ref 6.4–8.2)
RBC # BLD AUTO: 4.21 X10*6/UL (ref 4.5–5.9)
SODIUM SERPL-SCNC: 137 MMOL/L (ref 136–145)
TRIGL SERPL-MCNC: 102 MG/DL (ref 0–149)
TSH SERPL-ACNC: 0.82 MIU/L (ref 0.44–3.98)
VIT B12 SERPL-MCNC: 341 PG/ML (ref 211–911)
VLDL: 20 MG/DL (ref 0–40)
WBC # BLD AUTO: 8.9 X10*3/UL (ref 4.4–11.3)

## 2024-11-06 PROCEDURE — 1036F TOBACCO NON-USER: CPT | Performed by: INTERNAL MEDICINE

## 2024-11-06 PROCEDURE — 1159F MED LIST DOCD IN RCRD: CPT | Performed by: INTERNAL MEDICINE

## 2024-11-06 PROCEDURE — 3075F SYST BP GE 130 - 139MM HG: CPT | Performed by: INTERNAL MEDICINE

## 2024-11-06 PROCEDURE — 82306 VITAMIN D 25 HYDROXY: CPT

## 2024-11-06 PROCEDURE — 82962 GLUCOSE BLOOD TEST: CPT | Performed by: INTERNAL MEDICINE

## 2024-11-06 PROCEDURE — 1158F ADVNC CARE PLAN TLK DOCD: CPT | Performed by: INTERNAL MEDICINE

## 2024-11-06 PROCEDURE — 99214 OFFICE O/P EST MOD 30 MIN: CPT | Performed by: INTERNAL MEDICINE

## 2024-11-06 PROCEDURE — 83036 HEMOGLOBIN GLYCOSYLATED A1C: CPT | Performed by: INTERNAL MEDICINE

## 2024-11-06 PROCEDURE — 36415 COLL VENOUS BLD VENIPUNCTURE: CPT

## 2024-11-06 PROCEDURE — 1123F ACP DISCUSS/DSCN MKR DOCD: CPT | Performed by: INTERNAL MEDICINE

## 2024-11-06 PROCEDURE — 1170F FXNL STATUS ASSESSED: CPT | Performed by: INTERNAL MEDICINE

## 2024-11-06 PROCEDURE — G0439 PPPS, SUBSEQ VISIT: HCPCS | Performed by: INTERNAL MEDICINE

## 2024-11-06 PROCEDURE — 82607 VITAMIN B-12: CPT

## 2024-11-06 PROCEDURE — 3078F DIAST BP <80 MM HG: CPT | Performed by: INTERNAL MEDICINE

## 2024-11-06 PROCEDURE — 1160F RVW MEDS BY RX/DR IN RCRD: CPT | Performed by: INTERNAL MEDICINE

## 2024-11-06 PROCEDURE — 1157F ADVNC CARE PLAN IN RCRD: CPT | Performed by: INTERNAL MEDICINE

## 2024-11-06 RX ORDER — LOVASTATIN 20 MG/1
20 TABLET ORAL NIGHTLY
Qty: 90 TABLET | Refills: 1 | Status: CANCELLED | OUTPATIENT
Start: 2024-11-06

## 2024-11-06 ASSESSMENT — ENCOUNTER SYMPTOMS
COUGH: 0
SORE THROAT: 0
ABDOMINAL PAIN: 0
FATIGUE: 0
ARTHRALGIAS: 0
BLOOD IN STOOL: 0
LOSS OF SENSATION IN FEET: 0
WHEEZING: 0
DIARRHEA: 0
SINUS PAIN: 0
FEVER: 0
DEPRESSION: 0
BRUISES/BLEEDS EASILY: 0
OCCASIONAL FEELINGS OF UNSTEADINESS: 1
DIFFICULTY URINATING: 0
PALPITATIONS: 0
DIZZINESS: 0
UNEXPECTED WEIGHT CHANGE: 0
HEADACHES: 0

## 2024-11-06 ASSESSMENT — ACTIVITIES OF DAILY LIVING (ADL)
GROCERY_SHOPPING: TOTAL CARE
DOING_HOUSEWORK: TOTAL CARE
MANAGING_FINANCES: TOTAL CARE
BATHING: NEEDS ASSISTANCE
TAKING_MEDICATION: NEEDS ASSISTANCE
DRESSING: INDEPENDENT

## 2024-11-06 NOTE — PROGRESS NOTES
Subjective   Patient ID: Jayjay García is a 88 y.o. male who presents for Medicare Annual Wellness Visit Subsequent, Diabetes, Hyperlipidemia, Med Refill, Hypertension, and Immunizations (Flu vaccine given).    Annual Medicare physical  - Vaccination reviewed proceed with flu vaccine as recommended  - Screening for depression negative  - Advanced directive reviewed  - Needs to complete blood work  -Alcohol use reviewed during 3 glasses of red wine a day counseled on alcohol abstinence    Follow-up  -  - Patient comes about exercise 10 to 15 minutes every day counseled about light weight lifting 5 pounds  -Counseled about alcohol moderation patient drinks 3 to 4 glasses of red wine daily patient does not 1 glass only patient aware of risk and benefit  -  hx of DM II, HFrEF, BPH, HLD, afib,.  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  s/p multiple taps, s/p Pleurx placement, removed 6/2021 no recurrence  -Status post abdominal aneurysm repair, Infrarenal AAA doing well follow-up vascular surgery as recommended no abdominal pain  - Diabetes diet controlled last hemoglobin A1c 6.2 good blood sugar  - Atherosclerotic artery disease compensated  - Hypertension controlled  - Benign prostatic hypertrophy symptoms are controlled continue with tamsulosin.  Follow-up 3 months follow-up lab results closely             Review of Systems   Constitutional:  Negative for fatigue, fever and unexpected weight change.   HENT:  Negative for congestion, ear discharge, ear pain, mouth sores, sinus pain and sore throat.    Eyes:  Negative for visual disturbance.   Respiratory:  Negative for cough and wheezing.    Cardiovascular:  Negative for chest pain, palpitations and leg swelling.   Gastrointestinal:  Negative for  "abdominal pain, blood in stool and diarrhea.   Genitourinary:  Negative for difficulty urinating.   Musculoskeletal:  Negative for arthralgias.   Skin:  Negative for rash.   Neurological:  Negative for dizziness and headaches.   Hematological:  Does not bruise/bleed easily.   Psychiatric/Behavioral:  Negative for behavioral problems.    All other systems reviewed and are negative.      Objective   Lab Results   Component Value Date    HGBA1C 6.4 11/06/2024      /64   Pulse 80   Temp 34.6 °C (94.3 °F)   Ht 1.727 m (5' 8\")   Wt 96.1 kg (211 lb 12.8 oz)   SpO2 97%   BMI 32.20 kg/m²   Lab Results   Component Value Date    WBC 8.9 11/06/2024    HGB 13.8 11/06/2024    HCT 41.3 11/06/2024     11/06/2024    CHOL 143 11/06/2024    TRIG 102 11/06/2024    HDL 47.0 11/06/2024    ALT 10 11/06/2024    AST 12 11/06/2024     11/06/2024    K 4.2 11/06/2024     11/06/2024    CREATININE 0.92 11/06/2024    BUN 16 11/06/2024    CO2 28 11/06/2024    TSH 0.82 11/06/2024    INR 2.30 (N) 11/04/2024    HGBA1C 6.4 11/06/2024     par   Physical Exam  Vitals and nursing note reviewed.   Constitutional:       Appearance: Normal appearance.   HENT:      Head: Normocephalic.      Nose: Nose normal.   Eyes:      Conjunctiva/sclera: Conjunctivae normal.      Pupils: Pupils are equal, round, and reactive to light.   Cardiovascular:      Rate and Rhythm: Regular rhythm.   Pulmonary:      Effort: Pulmonary effort is normal.      Breath sounds: Normal breath sounds.   Abdominal:      General: Abdomen is flat.      Palpations: Abdomen is soft.   Musculoskeletal:      Cervical back: Neck supple.   Skin:     General: Skin is warm.   Neurological:      General: No focal deficit present.      Mental Status: He is oriented to person, place, and time.   Psychiatric:         Mood and Affect: Mood normal.         Assessment/Plan   Jayjay was seen today for medicare annual wellness visit subsequent, diabetes, hyperlipidemia, med " refill, hypertension and immunizations.  Diagnoses and all orders for this visit:  Routine general medical examination at health care facility (Primary)  -     1 Year Follow Up In Primary Care - Wellness Exam; Future  Hypercholesteremia  -     Lipid Panel; Future  DM (diabetes mellitus), type 2 with peripheral vascular complications  -     POCT fingerstick glucose manually resulted  -     POCT glycosylated hemoglobin (Hb A1C) manually resulted  High risk medication use  -     CBC and Auto Differential; Future  Hypertension, unspecified type  -     Comprehensive Metabolic Panel; Future  Other fatigue  -     TSH with reflex to Free T4 if abnormal; Future  Vitamin B12 deficiency  -     Vitamin B12; Future  Vitamin D deficiency, unspecified  -     Vitamin D 25-Hydroxy,Total (for eval of Vitamin D levels); Future  Hyperlipidemia, unspecified hyperlipidemia type  DM type 2, goal: symptom mgmt (Multi)  Atrial fibrillation, unspecified type (Multi)  Chronic obstructive pulmonary disease, unspecified COPD type (Multi)  Benign prostatic hyperplasia, unspecified whether lower urinary tract symptoms present  Other orders  -     Follow Up In Primary Care - Established  -     Follow Up In Primary Care - Established; Future   Annual Medicare physical  - Vaccination reviewed proceed with flu vaccine as recommended  - Screening for depression negative  - Advanced directive reviewed  - Needs to complete blood work  -Alcohol use reviewed during 3 glasses of red wine a day counseled on alcohol abstinence    Follow-up  -  - Patient comes about exercise 10 to 15 minutes every day counseled about light weight lifting 5 pounds  -Counseled about alcohol moderation patient drinks 3 to 4 glasses of red wine daily patient does not 1 glass only patient aware of risk and benefit  -  hx of DM II, HFrEF, BPH, HLD, afib,.  Compensated continue with current medication  - Idiopathic recurrent left pleural effusion, T2 vocal cord cancer s/p removal  complicated with anterior glottic web,  Doing well no swallowing problems  - -Recurrent left pleural effusion compensated no recurrence.  s/p multiple taps, s/p Pleurx placement, removed 6/2021 no recurrence  -Status post abdominal aneurysm repair, Infrarenal AAA doing well follow-up vascular surgery as recommended no abdominal pain  - Diabetes diet controlled last hemoglobin A1c 6.2 good blood sugar  - Atherosclerotic artery disease compensated  - Hypertension controlled  - Benign prostatic hypertrophy symptoms are controlled continue with tamsulosin.  Follow-up 3 months follow-up lab results closely

## 2024-11-19 DIAGNOSIS — K21.9 GASTRO-ESOPHAGEAL REFLUX DISEASE WITHOUT ESOPHAGITIS: ICD-10-CM

## 2024-11-19 RX ORDER — PANTOPRAZOLE SODIUM 40 MG/1
40 TABLET, DELAYED RELEASE ORAL DAILY
Qty: 90 TABLET | Refills: 1 | Status: SHIPPED | OUTPATIENT
Start: 2024-11-19

## 2024-11-21 ENCOUNTER — APPOINTMENT (OUTPATIENT)
Dept: PRIMARY CARE | Facility: CLINIC | Age: 89
End: 2024-11-21
Payer: MEDICARE

## 2024-11-21 ENCOUNTER — TELEPHONE (OUTPATIENT)
Dept: PRIMARY CARE | Facility: CLINIC | Age: 89
End: 2024-11-21

## 2024-11-25 DIAGNOSIS — I48.11 LONGSTANDING PERSISTENT ATRIAL FIBRILLATION (MULTI): ICD-10-CM

## 2024-11-25 RX ORDER — WARFARIN 4 MG/1
TABLET ORAL
Qty: 102 TABLET | Refills: 1 | Status: SHIPPED | OUTPATIENT
Start: 2024-11-25

## 2024-12-02 ENCOUNTER — APPOINTMENT (OUTPATIENT)
Dept: PHARMACY | Facility: HOSPITAL | Age: 89
End: 2024-12-02
Payer: MEDICARE

## 2024-12-04 ENCOUNTER — APPOINTMENT (OUTPATIENT)
Dept: PODIATRY | Facility: CLINIC | Age: 89
End: 2024-12-04
Payer: MEDICARE

## 2024-12-12 ENCOUNTER — APPOINTMENT (OUTPATIENT)
Dept: PHARMACY | Facility: HOSPITAL | Age: 89
End: 2024-12-12
Payer: MEDICARE

## 2024-12-16 ENCOUNTER — ANTICOAGULATION - WARFARIN VISIT (OUTPATIENT)
Dept: PHARMACY | Facility: HOSPITAL | Age: 89
End: 2024-12-16
Payer: MEDICARE

## 2024-12-16 DIAGNOSIS — I48.91 ATRIAL FIBRILLATION, UNSPECIFIED TYPE (MULTI): Primary | ICD-10-CM

## 2024-12-16 LAB
POC INR: 2.1 (ref 2–3)
POC PROTHROMBIN TIME: ABNORMAL

## 2024-12-16 PROCEDURE — 85610 PROTHROMBIN TIME: CPT | Mod: QW | Performed by: PHARMACY

## 2024-12-16 NOTE — PROGRESS NOTES
Subjective     Jayjay García is a 89 y.o. male who presents for anticoagulation follow up.     Location: [facility]     Referring Provider: Lisa Nichols MD   INR Goal: 2.0-3.0  Indication: Atrial Fibrillation/Atrial Flutter    Bleeding signs/symptoms: No  Bruising: No   Major bleeding event: No  Thrombosis signs/symptoms: No  Thromboembolic event: No  Missed doses: No  Extra doses: No  Medication changes: No  Dietary changes: No  Change in health: No  Change in activity: No  Alcohol: No  Other concerns: No  Upcoming Surgeries: no  Parenteral anticoagulation: no    Current Outpatient Medications on File Prior to Visit   Medication Sig Dispense Refill    amLODIPine-benazepriL (Lotrel) 10-40 mg capsule Take 1 capsule by mouth once daily. 90 capsule 3    aspirin 81 mg EC tablet Take by mouth.      carvedilol (Coreg) 3.125 mg tablet TAKE 1 TABLET TWICE DAILY  WITH MEALS 180 tablet 1    cholecalciferol (Vitamin D-3) 5,000 Units tablet Take by mouth.      lovastatin (Mevacor) 20 mg tablet Take 1 tablet (20 mg) by mouth once daily at bedtime. 90 tablet 1    metFORMIN (Glucophage) 1,000 mg tablet Take 1 tablet (1,000 mg) by mouth once daily. Take with food. 90 tablet 1    pantoprazole (ProtoNix) 40 mg EC tablet Take 1 tablet (40 mg) by mouth once daily. Do not crush, chew, or split. 90 tablet 1    rosuvastatin (Crestor) 10 mg tablet Take by mouth.      tamsulosin (Flomax) 0.4 mg 24 hr capsule Take 1 capsule (0.4 mg) by mouth once daily. 90 capsule 3    warfarin (Coumadin) 4 mg tablet Take 1.5 tablets (6 mg) by mouth on Mon/Wed/Fri and take 1 tablet (4 mg) by mouth all other days 102 tablet 1     No current facility-administered medications on file prior to visit.        Objective   Anticoagulation Summary  As of 2024      INR goal:  2.0-3.0   TTR:  57.8% (1.2 y)   INR used for dosin.10 (2024)   Weekly warfarin total:  34 mg             Lab Results   Component Value  Date    INR 2.10 (N) 12/16/2024    INR 2.30 (N) 11/04/2024    INR 1.80 (A) 10/14/2024    PROTIME 39.0 (H) 05/10/2024    PROTIME 2.0 (A) 03/20/2023    PROTIME 22.4 (H) 03/13/2023    PROTIME CANCELED 02/28/2023    PROTIME 18.1 (H) 09/02/2022       Assessment/Plan   Current INR is Therapeutic at 2.1. Previous INR was Therapeutic at 2.3. No changes reported from previous visit. Advised patient to continue current warfarin regimen of 6 mg on Mon/Wed/Fri and 4 mg all other days.      Follow Up: 1 month     Araseli Seaman, LianaD

## 2024-12-18 ENCOUNTER — APPOINTMENT (OUTPATIENT)
Dept: PODIATRY | Facility: CLINIC | Age: 89
End: 2024-12-18
Payer: MEDICARE

## 2024-12-18 DIAGNOSIS — M79.671 FOOT PAIN, BILATERAL: ICD-10-CM

## 2024-12-18 DIAGNOSIS — E11.51 DM (DIABETES MELLITUS), TYPE 2 WITH PERIPHERAL VASCULAR COMPLICATIONS: Primary | ICD-10-CM

## 2024-12-18 DIAGNOSIS — B35.1 MYCOTIC TOENAILS: ICD-10-CM

## 2024-12-18 DIAGNOSIS — I73.9 PVD (PERIPHERAL VASCULAR DISEASE) (CMS-HCC): ICD-10-CM

## 2024-12-18 DIAGNOSIS — M79.672 FOOT PAIN, BILATERAL: ICD-10-CM

## 2024-12-18 PROCEDURE — 11721 DEBRIDE NAIL 6 OR MORE: CPT | Performed by: PODIATRIST

## 2024-12-18 NOTE — PROGRESS NOTES
This is a 89 y.o. male est patient for foot pain    History of Present Illness:   This 89 year old male presents with wife  States is unable to trim nails on own  Denies trauma  No other pedal complaints    Past Medical History  Past Medical History:   Diagnosis Date    Elevated prostate specific antigen (PSA)     Elevated prostate specific antigen (PSA)    Other conditions influencing health status     Screening for malignant neoplasms, colon    Overactive bladder     Overactive bladder    Personal history of other endocrine, nutritional and metabolic disease 10/29/2013    History of type 1 diabetes mellitus       Medications and Allergies have been reviewed.    Review Of Systems:  GENERAL: No weight loss, malaise or fevers.  HEENT: Negative for frequent or significant headaches,   RESPIRATORY: Negative for cough, wheezing or shortness of breath.  CARDIOVASCULAR: Negative for chest pain, leg swelling or palpitations.    Examination of Both Lower Extremities:   Objective:   Vasc: DP and PT pulses are palpable bilateral 1/4.  CFT is less than 3 seconds bilateral.  Skin temperature is warm to cool proximal to distal bilateral.  Varicosities noted.     Neuro: Gross sensation intact b/l     Derm: Nails 1-5 bilateral are intact, thick and discolored. NO HPK tissue noted. There are no hyperkeratoses, ulcerations, verruca or other lesions noted.      Ortho: Muscle strength is 5/5 for all pedal groups tested.      1. DM (diabetes mellitus), type 2 with peripheral vascular complications        2. PVD (peripheral vascular disease) (CMS-HCC)        3. Foot pain, bilateral        4. Mycotic toenails          Patient educated on proper diabetic foot care.  Nails 1-5 b/l were debrided in thickness and length with nail cutting forceps.  Low risk pedal concerns noted.   A1C 6.4 Nov 2024  Patient to follow up in 6 mos or sooner if any problems arise.   Patient was in agreement to this plan. All questions answered.  Family ok to trim  and file down nails.     PCP Dr. Nichols Last visit Nov 2024    Araseli Walker, EDEN  [phone]  [facility]  Fax: [phone]

## 2025-01-13 ENCOUNTER — APPOINTMENT (OUTPATIENT)
Dept: PHARMACY | Facility: HOSPITAL | Age: OVER 89
End: 2025-01-13
Payer: MEDICARE

## 2025-01-13 DIAGNOSIS — I48.91 ATRIAL FIBRILLATION, UNSPECIFIED TYPE (MULTI): Primary | ICD-10-CM

## 2025-01-13 LAB
POC INR: 1.8 (ref 2–3)
POC PROTHROMBIN TIME: ABNORMAL

## 2025-01-13 PROCEDURE — 85610 PROTHROMBIN TIME: CPT | Mod: QW | Performed by: PHARMACY

## 2025-01-13 NOTE — PROGRESS NOTES
Subjective     Jayjay García is a 89 y.o. male who presents for anticoagulation follow up.     Location: [facility]     Referring Provider: Lisa Nichols MD   INR Goal: 2.0-3.0  Indication: Atrial Fibrillation/Atrial Flutter    Bleeding signs/symptoms: No  Bruising: No   Major bleeding event: No  Thrombosis signs/symptoms: No  Thromboembolic event: No  Missed doses: No  Extra doses: No  Medication changes: No  Dietary changes: No  Change in health: No  Change in activity: No  Alcohol: No  Other concerns: No  Upcoming Surgeries: no  Parenteral anticoagulation: no    Current Outpatient Medications on File Prior to Visit   Medication Sig Dispense Refill    amLODIPine-benazepriL (Lotrel) 10-40 mg capsule Take 1 capsule by mouth once daily. 90 capsule 3    aspirin 81 mg EC tablet Take by mouth.      carvedilol (Coreg) 3.125 mg tablet TAKE 1 TABLET TWICE DAILY  WITH MEALS 180 tablet 1    cholecalciferol (Vitamin D-3) 5,000 Units tablet Take by mouth.      lovastatin (Mevacor) 20 mg tablet Take 1 tablet (20 mg) by mouth once daily at bedtime. 90 tablet 1    metFORMIN (Glucophage) 1,000 mg tablet Take 1 tablet (1,000 mg) by mouth once daily. Take with food. 90 tablet 1    pantoprazole (ProtoNix) 40 mg EC tablet Take 1 tablet (40 mg) by mouth once daily. Do not crush, chew, or split. 90 tablet 1    rosuvastatin (Crestor) 10 mg tablet Take by mouth.      tamsulosin (Flomax) 0.4 mg 24 hr capsule Take 1 capsule (0.4 mg) by mouth once daily. 90 capsule 3    warfarin (Coumadin) 4 mg tablet Take 1.5 tablets (6 mg) by mouth on Mon/Wed/Fri and take 1 tablet (4 mg) by mouth all other days 102 tablet 1     No current facility-administered medications on file prior to visit.        Objective   Anticoagulation Summary  As of 2025      INR goal:  2.0-3.0   TTR:  56.3% (1.2 y)   INR used for dosin.80 (2025)   Weekly warfarin total:  34 mg               Lab Results   Component Value  Date    INR 1.80 (A) 01/13/2025    INR 2.10 (N) 12/16/2024    INR 2.30 (N) 11/04/2024    PROTIME 39.0 (H) 05/10/2024    PROTIME 2.0 (A) 03/20/2023    PROTIME 22.4 (H) 03/13/2023    PROTIME CANCELED 02/28/2023    PROTIME 18.1 (H) 09/02/2022       Assessment/Plan   Current INR is Subtherapeutic at 1.8. Previous INR was Therapeutic at 2.1. No changes reported from previous visit. Advised patient to take 8 mg x1 dose today, then resume current warfarin regimen of 6 mg on Mon/Wed/Fri and 4 mg all other days.  Of note, patients wife will be traveling out of the state and will be unable to return to the office until after 2/14/25. Plan for follow up on 2/17/25.     Follow Up: 1 month     Araseli Seaman, PharmD

## 2025-01-22 DIAGNOSIS — I10 PRIMARY HYPERTENSION: ICD-10-CM

## 2025-01-22 DIAGNOSIS — E11.9: ICD-10-CM

## 2025-01-22 DIAGNOSIS — I11.0 HYPERTENSIVE HEART DISEASE WITH HEART FAILURE: ICD-10-CM

## 2025-01-22 RX ORDER — AMLODIPINE AND BENAZEPRIL HYDROCHLORIDE 10; 40 MG/1; MG/1
1 CAPSULE ORAL DAILY
Qty: 90 CAPSULE | Refills: 1 | Status: SHIPPED | OUTPATIENT
Start: 2025-01-22 | End: 2026-01-22

## 2025-01-22 RX ORDER — METFORMIN HYDROCHLORIDE 1000 MG/1
1000 TABLET ORAL DAILY
Qty: 90 TABLET | Refills: 1 | Status: SHIPPED | OUTPATIENT
Start: 2025-01-22

## 2025-01-22 RX ORDER — CARVEDILOL 3.12 MG/1
3.12 TABLET ORAL
Qty: 180 TABLET | Refills: 1 | Status: SHIPPED | OUTPATIENT
Start: 2025-01-22

## 2025-02-04 ENCOUNTER — OFFICE VISIT (OUTPATIENT)
Dept: PRIMARY CARE | Facility: CLINIC | Age: OVER 89
End: 2025-02-04
Payer: MEDICARE

## 2025-02-04 VITALS
WEIGHT: 209.4 LBS | DIASTOLIC BLOOD PRESSURE: 70 MMHG | OXYGEN SATURATION: 97 % | HEART RATE: 74 BPM | SYSTOLIC BLOOD PRESSURE: 104 MMHG | BODY MASS INDEX: 31.84 KG/M2 | TEMPERATURE: 96.9 F

## 2025-02-04 DIAGNOSIS — I50.42 CHRONIC COMBINED SYSTOLIC AND DIASTOLIC CONGESTIVE HEART FAILURE: ICD-10-CM

## 2025-02-04 DIAGNOSIS — E11.51 DM (DIABETES MELLITUS), TYPE 2 WITH PERIPHERAL VASCULAR COMPLICATIONS: ICD-10-CM

## 2025-02-04 DIAGNOSIS — R19.5 DARK STOOLS: Primary | ICD-10-CM

## 2025-02-04 DIAGNOSIS — Z79.01 ON WARFARIN THERAPY: ICD-10-CM

## 2025-02-04 DIAGNOSIS — I48.91 ATRIAL FIBRILLATION, UNSPECIFIED TYPE (MULTI): ICD-10-CM

## 2025-02-04 DIAGNOSIS — C32.9 LARYNGEAL CANCER (MULTI): ICD-10-CM

## 2025-02-04 PROCEDURE — 1036F TOBACCO NON-USER: CPT | Performed by: INTERNAL MEDICINE

## 2025-02-04 PROCEDURE — G2211 COMPLEX E/M VISIT ADD ON: HCPCS | Performed by: INTERNAL MEDICINE

## 2025-02-04 PROCEDURE — 1157F ADVNC CARE PLAN IN RCRD: CPT | Performed by: INTERNAL MEDICINE

## 2025-02-04 PROCEDURE — 99214 OFFICE O/P EST MOD 30 MIN: CPT | Performed by: INTERNAL MEDICINE

## 2025-02-04 PROCEDURE — 1159F MED LIST DOCD IN RCRD: CPT | Performed by: INTERNAL MEDICINE

## 2025-02-04 PROCEDURE — 1160F RVW MEDS BY RX/DR IN RCRD: CPT | Performed by: INTERNAL MEDICINE

## 2025-02-04 ASSESSMENT — ENCOUNTER SYMPTOMS
DIFFICULTY URINATING: 0
SORE THROAT: 0
WHEEZING: 0
BLOOD IN STOOL: 0
DIZZINESS: 0
FATIGUE: 1
DIARRHEA: 0
COUGH: 0
BRUISES/BLEEDS EASILY: 0
ABDOMINAL PAIN: 0
SINUS PAIN: 0
UNEXPECTED WEIGHT CHANGE: 0
FEVER: 0
HEADACHES: 0
ARTHRALGIAS: 0
PALPITATIONS: 0

## 2025-02-05 LAB
ANION GAP SERPL CALCULATED.4IONS-SCNC: 12 MMOL/L (CALC) (ref 7–17)
BASOPHILS # BLD AUTO: 28 CELLS/UL (ref 0–200)
BASOPHILS NFR BLD AUTO: 0.3 %
BUN SERPL-MCNC: 16 MG/DL (ref 7–25)
BUN/CREAT SERPL: NORMAL (CALC) (ref 6–22)
CALCIUM SERPL-MCNC: 9.6 MG/DL (ref 8.6–10.3)
CHLORIDE SERPL-SCNC: 103 MMOL/L (ref 98–110)
CO2 SERPL-SCNC: 24 MMOL/L (ref 20–32)
CREAT SERPL-MCNC: 0.95 MG/DL (ref 0.7–1.22)
EGFRCR SERPLBLD CKD-EPI 2021: 77 ML/MIN/1.73M2
EOSINOPHIL # BLD AUTO: 166 CELLS/UL (ref 15–500)
EOSINOPHIL NFR BLD AUTO: 1.8 %
ERYTHROCYTE [DISTWIDTH] IN BLOOD BY AUTOMATED COUNT: 12.1 % (ref 11–15)
GLUCOSE SERPL-MCNC: 120 MG/DL (ref 65–139)
HCT VFR BLD AUTO: 39 % (ref 38.5–50)
HGB BLD-MCNC: 13.2 G/DL (ref 13.2–17.1)
INR PPP: 2
LYMPHOCYTES # BLD AUTO: 644 CELLS/UL (ref 850–3900)
LYMPHOCYTES NFR BLD AUTO: 7 %
MCH RBC QN AUTO: 31.7 PG (ref 27–33)
MCHC RBC AUTO-ENTMCNC: 33.8 G/DL (ref 32–36)
MCV RBC AUTO: 93.5 FL (ref 80–100)
MONOCYTES # BLD AUTO: 1288 CELLS/UL (ref 200–950)
MONOCYTES NFR BLD AUTO: 14 %
NEUTROPHILS # BLD AUTO: 7075 CELLS/UL (ref 1500–7800)
NEUTROPHILS NFR BLD AUTO: 76.9 %
PLATELET # BLD AUTO: 282 THOUSAND/UL (ref 140–400)
PMV BLD REES-ECKER: 10.8 FL (ref 7.5–12.5)
POTASSIUM SERPL-SCNC: 4.5 MMOL/L (ref 3.5–5.3)
PROTHROMBIN TIME: 19.8 SEC (ref 9–11.5)
RBC # BLD AUTO: 4.17 MILLION/UL (ref 4.2–5.8)
SODIUM SERPL-SCNC: 139 MMOL/L (ref 135–146)
WBC # BLD AUTO: 9.2 THOUSAND/UL (ref 3.8–10.8)

## 2025-02-05 NOTE — PROGRESS NOTES
Subjective   Patient ID: Jayjay García is a 89 y.o. male who presents for Stool Color Change (Stool looks like coffee groungs) and Fatigue.    - Patient with underlying dementia unable to give history, caregiver noted underlying dark stools came today for evaluation patient on warfarin therapy  Obtain stat INR CBC bmp follow-up results closely  --Counseled about alcohol moderation patient drinks 3 to 4 glasses of red wine daily patient does not 1 glass only patient aware of risk and benefit  -  hx of DM II, HFrEF, BPH, HLD, afib,.  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  - -Recurrent left pleural effusion compensated no recurrence.  s/p multiple taps, s/p Pleurx placement, removed 6/2021 no recurrence  -Status post abdominal aneurysm repair, Infrarenal AAA doing well follow-up vascular surgery as recommended no abdominal pain  - Diabetes diet controlled last hemoglobin A1c 6.2 good blood sugar  - Atherosclerotic artery disease compensated  - Hypertension controlled  - Benign prostatic hypertrophy symptoms are controlled continue with tamsulosin.  Follow-up 3 months follow-up lab results closely         Fatigue  Associated symptoms include fatigue. Pertinent negatives include no abdominal pain, arthralgias, chest pain, congestion, coughing, fever, headaches, rash or sore throat.          Review of Systems   Constitutional:  Positive for fatigue. Negative for fever and unexpected weight change.   HENT:  Negative for congestion, ear discharge, ear pain, mouth sores, sinus pain and sore throat.    Eyes:  Negative for visual disturbance.   Respiratory:  Negative for cough and wheezing.    Cardiovascular:  Negative for chest pain, palpitations and leg swelling.   Gastrointestinal:  Negative for abdominal pain, blood in stool and diarrhea.   Genitourinary:   Negative for difficulty urinating.   Musculoskeletal:  Negative for arthralgias.   Skin:  Negative for rash.   Neurological:  Negative for dizziness and headaches.   Hematological:  Does not bruise/bleed easily.   Psychiatric/Behavioral:  Negative for behavioral problems.    All other systems reviewed and are negative.      Objective   Lab Results   Component Value Date    HGBA1C 6.4 11/06/2024      /70   Pulse 74   Temp 36.1 °C (96.9 °F)   Wt 95 kg (209 lb 6.4 oz)   SpO2 97%   BMI 31.84 kg/m²     Physical Exam  Vitals and nursing note reviewed.   Constitutional:       Appearance: Normal appearance.   HENT:      Head: Normocephalic.      Nose: Nose normal.   Eyes:      Conjunctiva/sclera: Conjunctivae normal.      Pupils: Pupils are equal, round, and reactive to light.   Cardiovascular:      Rate and Rhythm: Regular rhythm.   Pulmonary:      Effort: Pulmonary effort is normal.      Breath sounds: Normal breath sounds.   Abdominal:      General: Abdomen is flat.      Palpations: Abdomen is soft.   Musculoskeletal:      Cervical back: Neck supple.   Skin:     General: Skin is warm.   Neurological:      General: No focal deficit present.      Mental Status: He is oriented to person, place, and time.   Psychiatric:         Mood and Affect: Mood normal.         Assessment/Plan   Jayjay was seen today for stool color change and fatigue.  Diagnoses and all orders for this visit:  Dark stools (Primary)  -     CBC and Auto Differential; Future  -     Basic metabolic panel; Future  -     Protime-INR; Future  -     CBC and Auto Differential  -     Basic metabolic panel  -     Protime-INR  On warfarin therapy  -     Protime-INR; Future  -     Protime-INR  Laryngeal cancer (Multi)  Chronic combined systolic and diastolic congestive heart failure  Atrial fibrillation, unspecified type (Multi)  DM (diabetes mellitus), type 2 with peripheral vascular complications      Patient with underlying dementia unable to give  history, caregiver noted underlying dark stools came today for evaluation patient on warfarin therapy  Obtain stat INR CBC bmp follow-up results closely  --Counseled about alcohol moderation patient drinks 3 to 4 glasses of red wine daily patient does not 1 glass only patient aware of risk and benefit  -  hx of DM II, HFrEF, BPH, HLD, afib,.  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  - -Recurrent left pleural effusion compensated no recurrence.  s/p multiple taps, s/p Pleurx placement, removed 6/2021 no recurrence  -Status post abdominal aneurysm repair, Infrarenal AAA doing well follow-up vascular surgery as recommended no abdominal pain  - Diabetes diet controlled last hemoglobin A1c 6.2 good blood sugar  - Atherosclerotic artery disease compensated  - Hypertension controlled  - Benign prostatic hypertrophy symptoms are controlled continue with tamsulosin.  Follow-up 3 months follow-up lab results closely

## 2025-02-17 ENCOUNTER — APPOINTMENT (OUTPATIENT)
Dept: PHARMACY | Facility: HOSPITAL | Age: OVER 89
End: 2025-02-17
Payer: MEDICARE

## 2025-02-17 DIAGNOSIS — I48.91 ATRIAL FIBRILLATION, UNSPECIFIED TYPE (MULTI): Primary | ICD-10-CM

## 2025-02-17 LAB
POC INR: 1.4 (ref 2–3)
POC PROTHROMBIN TIME: ABNORMAL

## 2025-02-17 PROCEDURE — 85610 PROTHROMBIN TIME: CPT | Mod: QW | Performed by: PHARMACY

## 2025-02-17 NOTE — PROGRESS NOTES
Subjective     Jayjay García is a 89 y.o. male who presents for anticoagulation follow up.     Location: [facility]     Referring Provider: Lisa Nichols MD   INR Goal: 2.0-3.0  Indication: Atrial Fibrillation/Atrial Flutter    Bleeding signs/symptoms: No  Bruising: No   Major bleeding event: No  Thrombosis signs/symptoms: No  Thromboembolic event: No  Missed doses: No  Extra doses: No  Medication changes: No  Dietary changes: No  Change in health: No  Change in activity: No  Alcohol: Yes  Other concerns: No  Upcoming Surgeries: no  Parenteral anticoagulation: no    Current Outpatient Medications on File Prior to Visit   Medication Sig Dispense Refill    amLODIPine-benazepriL (Lotrel) 10-40 mg capsule Take 1 capsule by mouth once daily. 90 capsule 1    aspirin 81 mg EC tablet Take by mouth.      carvedilol (Coreg) 3.125 mg tablet Take 1 tablet (3.125 mg) by mouth 2 times daily (morning and late afternoon). 180 tablet 1    cholecalciferol (Vitamin D-3) 5,000 Units tablet Take by mouth.      lovastatin (Mevacor) 20 mg tablet Take 1 tablet (20 mg) by mouth once daily at bedtime. 90 tablet 1    metFORMIN (Glucophage) 1,000 mg tablet Take 1 tablet (1,000 mg) by mouth once daily. Take with food. 90 tablet 1    pantoprazole (ProtoNix) 40 mg EC tablet Take 1 tablet (40 mg) by mouth once daily. Do not crush, chew, or split. 90 tablet 1    rosuvastatin (Crestor) 10 mg tablet Take by mouth.      tamsulosin (Flomax) 0.4 mg 24 hr capsule Take 1 capsule (0.4 mg) by mouth once daily. 90 capsule 3    warfarin (Coumadin) 4 mg tablet Take 1.5 tablets (6 mg) by mouth on Mon/Wed/Fri and take 1 tablet (4 mg) by mouth all other days 102 tablet 1     No current facility-administered medications on file prior to visit.        Objective   Anticoagulation Summary  As of 2025      INR goal:  2.0-3.0   TTR:  52.3% (1.3 y)   INR used for dosin.40 (2025)   Weekly warfarin total:  36 mg              Lab Results   Component Value Date    INR 1.40 (A) 02/17/2025    INR 2.0 (H) 02/04/2025    INR 1.80 (A) 01/13/2025    INR 2.10 (N) 12/16/2024    PROTIME 19.8 (H) 02/04/2025    PROTIME 39.0 (H) 05/10/2024    PROTIME 2.0 (A) 03/20/2023    PROTIME 22.4 (H) 03/13/2023    PROTIME CANCELED 02/28/2023    PROTIME 18.1 (H) 09/02/2022       Assessment/Plan   Current INR is Subtherapeutic at 1.4. Previous INR was Therapeutic at 2.0. Patient reports cutting back on wine intake since previous visit. Previously reported about 3-4 glasses per day. Now reporting about 2 glasses per day. Otherwise, no changes in diet, medications, or lifestyle reported. As alcohol increases INR, reduction likely contributing to subtherapeutic level today. Advised patient to take 8 mg x1 dose today, then increase current warfarin regimen to 6 mg on Mon/Wed/Fri/Sat and 4 mg all other days. Plan for follow up in one month per caregiver request.     Follow Up: 1 month     Araseli Seaman, LianaD

## 2025-03-10 ENCOUNTER — OFFICE VISIT (OUTPATIENT)
Dept: OTOLARYNGOLOGY | Facility: HOSPITAL | Age: OVER 89
End: 2025-03-10
Payer: MEDICARE

## 2025-03-10 VITALS — WEIGHT: 210 LBS | HEIGHT: 68 IN | BODY MASS INDEX: 31.83 KG/M2 | TEMPERATURE: 97.7 F

## 2025-03-10 DIAGNOSIS — C32.9 LARYNGEAL CANCER (MULTI): Primary | ICD-10-CM

## 2025-03-10 DIAGNOSIS — Q31.0 ANTERIOR GLOTTIC WEB: ICD-10-CM

## 2025-03-10 DIAGNOSIS — R49.0 DYSPHONIA: ICD-10-CM

## 2025-03-10 DIAGNOSIS — R49.8 OTHER VOICE AND RESONANCE DISORDERS: ICD-10-CM

## 2025-03-10 DIAGNOSIS — Z85.21 HX OF LARYNGEAL CANCER: ICD-10-CM

## 2025-03-10 PROCEDURE — 1159F MED LIST DOCD IN RCRD: CPT | Performed by: OTOLARYNGOLOGY

## 2025-03-10 PROCEDURE — 1157F ADVNC CARE PLAN IN RCRD: CPT | Performed by: OTOLARYNGOLOGY

## 2025-03-10 PROCEDURE — 99214 OFFICE O/P EST MOD 30 MIN: CPT | Mod: 25 | Performed by: OTOLARYNGOLOGY

## 2025-03-10 PROCEDURE — 99214 OFFICE O/P EST MOD 30 MIN: CPT | Performed by: OTOLARYNGOLOGY

## 2025-03-10 PROCEDURE — 1126F AMNT PAIN NOTED NONE PRSNT: CPT | Performed by: OTOLARYNGOLOGY

## 2025-03-10 ASSESSMENT — PAIN SCALES - GENERAL: PAINLEVEL_OUTOF10: 0-NO PAIN

## 2025-03-10 ASSESSMENT — PATIENT HEALTH QUESTIONNAIRE - PHQ9
2. FEELING DOWN, DEPRESSED OR HOPELESS: NOT AT ALL
SUM OF ALL RESPONSES TO PHQ9 QUESTIONS 1 & 2: 0
1. LITTLE INTEREST OR PLEASURE IN DOING THINGS: NOT AT ALL

## 2025-03-10 NOTE — PROGRESS NOTES
ASSESSMENT AND PLAN:   Jayjay García is a 89 y.o. male with a history of vocal cord cancer s/p resection. The patient is doing very well and CHATO on evaluation. There are no lesions on the vocal cords. + Web.  The patient does have some vibratory restrictions due to web on anterior commissure. Fundamental frequency of 250 Hertz. Does not bother patient.      The patient appears to be doing well. He has not had any weight loss or swallow concerns.     CHATO. Scar band is unchanged. CXR.  Follow-up in 1 year.          Tonsilith On left Tonsil:       Anterior glottic web:       Reason For Consult  Chief Complaint   Patient presents with    Follow-up     Throat cancer review.        HISTORY OF PRESENT ILLNESS:  Jayjay García is a 89 y.o. male presenting for a follow-up visit with me for throat cancer evaluation.      The patient reports his voice feels a little funny. He is audible. He denies recent changes to his vocal quality.     The patient denies weight loss.   The patient has hearing aids but he denies needing them. He does not wear them.     Prior History:   Last seen 8/12/24 Assessment and Plan:  Jayjay García is a 88 y.o. male with a history of laryngeal cancer s/p resection.   Patient evaluation finds anterior glottic web, likely due to the resection, but he is doing quite well and has no current concerns, and no findings  on strobe today.     Mild globus - conservative measures discussed.   Hearing loss - severe but will not agree to wear hearing aids.  Does not appear to warrant additional intervention at this time if he does not want to pursue any treatment.    Follow-up in 6 months.     On 3/11/24:  Jayjay García is a 88 y.o. male with a history of bilateral vocal cord cancer here today for followup.  Today's examination to include stroboscopy demonstrates an elevated fundamental frequency, lowest at 190 Hz. Fundamental is approximately 220-230 Hz, occasionally up to 300 Hz. This does not seem to be  bothering him at all. We previously discussed his hearing loss. He does have hearing aids but does not wear them.  I would like to see the patient back in 4 months for repeat evaluation. He will see me sooner if there are any changes to his voice/airway/swallow. Currently CHATO.    On 9/11/24:  This is a 87 year old male with a history of Type 3 cordectomies bilaterally. 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 He has a pitch of 270 Hz which is appropriate considering his anterior glottic web. He denies additional concerns at this time.   He has hearing loss and was prescribed hearing aids and doesn't wish to wear them. Per his wife, additional evaluation of his hearing is warranted.  He will follow up in 6 months, or sooner if he has changes to voice or swallow.     On 3/13/23:  This is a 86 year old male with a history of T2 vocal cord cancer s/p resection.   Stroboscopy shows no concerning features. He has a car band anterior creating a shortened vocal cord and increased pitch with mild strain. He has good vibratory wave on the left and stiffened neocord on the right. He is doing well. He has significant hearing loss which is impacting his communication. He has hearing aids that he doesn't wear. He may be a candidate for if he desires. He may see one of our neurootology team. CHATO.     On 12/12/22:  This is a 86 year old male with a history of T2 vocal cord cancer .He has an anterior glottic web that is causing dysphonia with granulation tissue on the superior surface of the R TVC. Previous biopsies of the granulation tissue show no concerns. We discussed repeat procedure to improve his voice quality, however, he feels that his voice is fine at this time. He will follow up in March 2023. We will discuss again the findings at that time. If there is worsening of granulation, we will proceed with biopsy that same day.     On  11/2/22:  S/p microdirect laryngoscopy, diagnostic, Microdirect laryngoscopy with use of CO2 laser (excision/ablation), Microdirect laryngoscopy with biopsy, MDL with vocal fold injection of steroid and prolaryn gel    On 10/10/22:  This is a 86 year old male with a history of bialteral vocal cord cancer that was previously treated with revision surgery on 08/30/22. There were concerns for deep components and this was visually clearly during surgery.   We discussed consideration of repeat surgery for a second look of the airway and perform additional biopsies if needed and treat scar tissue. he would benefit form SLP in the future to avoid MTD. There is no evidence of persistent disease on my examination today.     On 8/31/22:  S/p  MicroDirect laryngoscopy diagnostic, bronchoscopy, diagnostic, CO2 laser cordectomy, bilateral, Left Type III (anterior 1/2), Right Type III ( to Ant commissure), Taken anteriorly to cartilage, Bilateral vestibular fold removal, Steroid and saline injection    On 8/22/22:  This is a 86 year old male with a history of R TVC mass with planned surgical excision later this month (8/31). This is a virtual visit to discuss risks, benefits, and alternatives of the procedure.   We discussed with his daughter and the patient on the procedure to include risks, benefits, and alternatives. We briefly discussed XRT and the patient doesn't wish to purse it. We discussed postoperative XRT if needed based in findings during surgery. He understands risks of recurrence as well as persistence after the procedure. We will contact him that he has preop appointments prior to surgery. He will follow up with cardiology in regards to holding Coumadin - I am ok with proceeding with surgery on Coumadin if required. His last INR was 1.8 and I am ok with doing surgery with INR < 2. He has stopped Coumadin in the past for surgery and he will discuss with his cardiology team regarding timing of the dosing of  "Coumadin. No other concerns were addressed. I would like to see the patient back for the procedure.        Past Medical History  He has a past medical history of Elevated prostate specific antigen (PSA), Other conditions influencing health status, Overactive bladder, and Personal history of other endocrine, nutritional and metabolic disease (10/29/2013). Surgical History  He has a past surgical history that includes Cataract extraction (05/15/2013); Other surgical history (01/28/2021); Cardiac surgery (11/18/2014); CT angio abdomen pelvis w and or wo IV IV contrast (7/8/2022); CT angio abdomen pelvis w and or wo IV IV contrast (4/12/2023); and IR angiogram aorta abdomen (3/14/2023).   Social History  He reports that he quit smoking about 32 years ago. His smoking use included cigarettes. He has a 40 pack-year smoking history. He has never used smokeless tobacco. He reports current alcohol use of about 21.0 standard drinks of alcohol per week. He reports that he does not use drugs. Allergies  Patient has no known allergies.     Family History  Family History   Problem Relation Name Age of Onset    Cancer Mother      Cancer Father      Heart disease Brother         Review of Systems  All 10 systems were reviewed and negative except for above.      Last Recorded Vitals  Temperature 36.5 °C (97.7 °F), temperature source Temporal, height 1.727 m (5' 8\"), weight 95.3 kg (210 lb).    Physical Exam  ENT Physical Exam   ENT Physical Exam  Constitutional  Appearance: patient appears well-developed and well-nourished,  Head and Face  Appearance: head appears normal and face appears normal;  Ear  Auricles: right auricle normal; left auricle normal;  Nose  External Nose: nares patent bilaterally;  Oral Cavity/Oropharynx  Lips: normal;  Neck  Neck: neck normal; neck palpation normal;  Respiratory  Inspection: no retractions visible;  Cardiovascular  Inspection: no peripheral edema present;  Neurovestibular  Mental Status: alert " and oriented;  Psychiatric: mood normal;  Cranial Nerves: cranial nerves intact;     Relevant Results    Procedures   Flexible Laryngoscopy w/ Videostroboscopy    VOICE AND SPEECH CHARACTERISTICS:  Normal spoken speech, (+) mild dysphonia, no roughness, (+) mild breathiness, (+) mild asthenia, (+) mild strain.    Intelligibility: normal.   Resonance: balanced.   Vocal Loudness: reduced.   Breath Support: poor coordination.    PROCEDURE:    Indications: voice change and history of vocal cord cancer  PROCEDURE NOTE: FLEXIBLE LARYNGOSCOPY WITH STROBOSCOPY  I recommended a flexible laryngoscopy with stroboscopy based on PE findings, and/or concern for mucosal wave details based upon history and/or for issues associated with hyperreflexic gag on mirror exam concerning for pathology. Risks, benefits, and alternatives were explained. The patient wishes to proceed and gives verbal consent.   Patient is seated in the exam chair. After adequate topical anesthesia, I advance the flexible endoscope. The examination included evaluation of the caballero, vallecula, base of tongue, pyriforms, post-cricoid area, larynx and immediate subglottis.    Reflux Finding Score  Subglottic edema: Absent   Thick Mucus: Absent   Granuloma: Absent   Ventricular Obliteration: Complete  Erythema/Hyperemia: Absent   IA Thickening: Mild   TVC Edema: Mild  Diffuse Laryngitis: Absent     Gross Arytenoid Movement: limited abduction bilateral.  Arytenoid Height: normal.   Supraglottic Tension: lateral.    Symmetry: normal.   Amplitude: reduced: bilateral.  Phase Closure: in-phase.  Periodicity: normal.  Mucosal Wave Lateral Excursion/Secondary Wave: Bilateral Vocal Cord: mild restriction - Wave moved more than ¼, less than ½ the width of the vocal fold.    Closure: closed.    Additional Findings: anterior web      Time Spent  Prep time on day of patient encounter: 10 minutes  Time spent directly with patient, family or caregiver: 15 minutes  Additional Time  Spent on Patient Care Activities/Discussion with SLP re care plan: 5 minutes  Documentation Time: 10 minutes  Other Time Spent: 0 minutes  Total: 40 minutes     Scribe Attestation  By signing my name below, I, Martha Roy , Scrib[place]ina   attest that this documentation has been prepared under the direction and in the presence of Raul Lamas MD.

## 2025-03-20 ENCOUNTER — APPOINTMENT (OUTPATIENT)
Dept: PHARMACY | Facility: HOSPITAL | Age: OVER 89
End: 2025-03-20
Payer: MEDICARE

## 2025-03-20 DIAGNOSIS — I48.91 ATRIAL FIBRILLATION, UNSPECIFIED TYPE (MULTI): Primary | ICD-10-CM

## 2025-03-20 LAB
POC INR: 4.1 (ref 2–3)
POC PROTHROMBIN TIME: ABNORMAL

## 2025-03-20 PROCEDURE — 85610 PROTHROMBIN TIME: CPT | Mod: QW | Performed by: PHARMACY

## 2025-03-20 NOTE — PROGRESS NOTES
Subjective     Jayjay García is a 89 y.o. male who presents for anticoagulation follow up.     Location: [facility]     Referring Provider: Lisa Nichols MD   INR Goal: 2.0-3.0  Indication: Atrial Fibrillation/Atrial Flutter    Bleeding signs/symptoms: No  Bruising: No   Major bleeding event: No  Thrombosis signs/symptoms: No  Thromboembolic event: No  Missed doses: No  Extra doses: No  Medication changes: No  Dietary changes: No  Change in health: No  Change in activity: No  Alcohol: Yes  Other concerns: No  Upcoming Surgeries: no  Parenteral anticoagulation: no    Current Outpatient Medications on File Prior to Visit   Medication Sig Dispense Refill    amLODIPine-benazepriL (Lotrel) 10-40 mg capsule Take 1 capsule by mouth once daily. 90 capsule 1    aspirin 81 mg EC tablet Take by mouth.      carvedilol (Coreg) 3.125 mg tablet Take 1 tablet (3.125 mg) by mouth 2 times daily (morning and late afternoon). 180 tablet 1    cholecalciferol (Vitamin D-3) 5,000 Units tablet Take by mouth.      lovastatin (Mevacor) 20 mg tablet Take 1 tablet (20 mg) by mouth once daily at bedtime. 90 tablet 1    metFORMIN (Glucophage) 1,000 mg tablet Take 1 tablet (1,000 mg) by mouth once daily. Take with food. 90 tablet 1    pantoprazole (ProtoNix) 40 mg EC tablet Take 1 tablet (40 mg) by mouth once daily. Do not crush, chew, or split. 90 tablet 1    rosuvastatin (Crestor) 10 mg tablet Take by mouth.      tamsulosin (Flomax) 0.4 mg 24 hr capsule Take 1 capsule (0.4 mg) by mouth once daily. 90 capsule 3    warfarin (Coumadin) 4 mg tablet Take 1.5 tablets (6 mg) by mouth on Mon/Wed/Fri and take 1 tablet (4 mg) by mouth all other days 102 tablet 1     No current facility-administered medications on file prior to visit.        Objective   Anticoagulation Summary  As of 3/20/2025      INR goal:  2.0-3.0   TTR:  51.4% (1.4 y)   INR used for dosin.10 (3/20/2025)   Weekly warfarin total:  34 mg              Lab Results   Component Value Date    INR 4.10 (A) 03/20/2025    INR 1.40 (A) 02/17/2025    INR 2.0 (H) 02/04/2025    INR 1.80 (A) 01/13/2025    PROTIME 19.8 (H) 02/04/2025    PROTIME 39.0 (H) 05/10/2024    PROTIME 2.0 (A) 03/20/2023    PROTIME 22.4 (H) 03/13/2023    PROTIME CANCELED 02/28/2023    PROTIME 18.1 (H) 09/02/2022       Assessment/Plan   Current INR is Supratherapeutic at 4.1. Previous INR was Subtherapeutic at 1.4. Patient reports increasing wine intake since previous visit. Reported back up to about 3-4 glasses per day. Otherwise, no changes in diet, medications, or lifestyle reported. As alcohol increases INR, increase likely contributing to supratherapeutic level today. Advised patient to hold warfarin x1 dose today, then lower current warfarin regimen to 6 mg on Mon/Wed/Fri and 4 mg all other days. Plan for follow up in one month per spouse's request.     Follow Up: 1 month - 4/14/25 @ 1040    Araseli Seaman, LianaD

## 2025-04-14 ENCOUNTER — APPOINTMENT (OUTPATIENT)
Dept: PHARMACY | Facility: HOSPITAL | Age: OVER 89
End: 2025-04-14
Payer: MEDICARE

## 2025-04-14 DIAGNOSIS — I48.91 ATRIAL FIBRILLATION, UNSPECIFIED TYPE (MULTI): ICD-10-CM

## 2025-04-14 DIAGNOSIS — I48.91 ATRIAL FIBRILLATION, UNSPECIFIED TYPE (MULTI): Primary | ICD-10-CM

## 2025-04-14 DIAGNOSIS — I48.11 LONGSTANDING PERSISTENT ATRIAL FIBRILLATION (MULTI): Primary | ICD-10-CM

## 2025-04-14 LAB
POC INR: 2.9 (ref 2–3)
POC PROTHROMBIN TIME: ABNORMAL

## 2025-04-14 PROCEDURE — 85610 PROTHROMBIN TIME: CPT | Mod: QW | Performed by: PHARMACY

## 2025-04-14 NOTE — PROGRESS NOTES
Subjective     Jayjay García is a 89 y.o. male who presents for anticoagulation follow up.     Location: [facility]     Referring Provider: Lisa Nichols MD   INR Goal: 2.0-3.0  Indication: Atrial Fibrillation/Atrial Flutter    Bleeding signs/symptoms: No  Bruising: No   Major bleeding event: No  Thrombosis signs/symptoms: No  Thromboembolic event: No  Missed doses: No  Extra doses: No  Medication changes: No  Dietary changes: No  Change in health: No  Change in activity: No  Alcohol: No  Other concerns: No  Upcoming Surgeries: no  Parenteral anticoagulation: no    Current Outpatient Medications on File Prior to Visit   Medication Sig Dispense Refill    amLODIPine-benazepriL (Lotrel) 10-40 mg capsule Take 1 capsule by mouth once daily. 90 capsule 1    aspirin 81 mg EC tablet Take by mouth.      carvedilol (Coreg) 3.125 mg tablet Take 1 tablet (3.125 mg) by mouth 2 times daily (morning and late afternoon). 180 tablet 1    cholecalciferol (Vitamin D-3) 5,000 Units tablet Take by mouth.      lovastatin (Mevacor) 20 mg tablet Take 1 tablet (20 mg) by mouth once daily at bedtime. 90 tablet 1    metFORMIN (Glucophage) 1,000 mg tablet Take 1 tablet (1,000 mg) by mouth once daily. Take with food. 90 tablet 1    pantoprazole (ProtoNix) 40 mg EC tablet Take 1 tablet (40 mg) by mouth once daily. Do not crush, chew, or split. 90 tablet 1    rosuvastatin (Crestor) 10 mg tablet Take by mouth.      tamsulosin (Flomax) 0.4 mg 24 hr capsule Take 1 capsule (0.4 mg) by mouth once daily. 90 capsule 3    warfarin (Coumadin) 4 mg tablet Take 1.5 tablets (6 mg) by mouth on Mon/Wed/Fri and take 1 tablet (4 mg) by mouth all other days 102 tablet 1     No current facility-administered medications on file prior to visit.        Objective   Anticoagulation Summary  As of 2025      INR goal:  2.0-3.0   TTR:  49.4% (1.5 y)   INR used for dosin.90 (2025)   Weekly warfarin total:  34 mg                Lab Results   Component Value Date    INR 2.90 (N) 04/14/2025    INR 4.10 (A) 03/20/2025    INR 1.40 (A) 02/17/2025    PROTIME 19.8 (H) 02/04/2025    PROTIME 39.0 (H) 05/10/2024    PROTIME 2.0 (A) 03/20/2023    PROTIME 22.4 (H) 03/13/2023    PROTIME CANCELED 02/28/2023    PROTIME 18.1 (H) 09/02/2022       Assessment/Plan   Current INR is Therapeutic at 2.9. Previous INR was Supratherapeutic at 4.1. Variation in previous INR levels believed to be related to inconsistent wine intake. Otherwise, no changes in diet, medications, or lifestyle reported. Advised patient to continue current warfarin regimen to 6 mg on Mon/Wed/Fri and 4 mg all other days. Plan for follow up in one month    Follow Up: 1 month - 5/15/25 @ [id number]    Araseli Seaman, LianaD

## 2025-04-22 ENCOUNTER — APPOINTMENT (OUTPATIENT)
Dept: PODIATRY | Facility: CLINIC | Age: OVER 89
End: 2025-04-22
Payer: COMMERCIAL

## 2025-04-22 DIAGNOSIS — B35.1 MYCOTIC TOENAILS: ICD-10-CM

## 2025-04-22 DIAGNOSIS — I73.9 PVD (PERIPHERAL VASCULAR DISEASE) (CMS-HCC): ICD-10-CM

## 2025-04-22 DIAGNOSIS — E11.51 DM (DIABETES MELLITUS), TYPE 2 WITH PERIPHERAL VASCULAR COMPLICATIONS: Primary | ICD-10-CM

## 2025-04-22 DIAGNOSIS — M79.671 FOOT PAIN, BILATERAL: ICD-10-CM

## 2025-04-22 DIAGNOSIS — M79.672 FOOT PAIN, BILATERAL: ICD-10-CM

## 2025-04-22 PROCEDURE — 1159F MED LIST DOCD IN RCRD: CPT | Performed by: PODIATRIST

## 2025-04-22 PROCEDURE — 11721 DEBRIDE NAIL 6 OR MORE: CPT | Performed by: PODIATRIST

## 2025-04-22 PROCEDURE — 1157F ADVNC CARE PLAN IN RCRD: CPT | Performed by: PODIATRIST

## 2025-04-22 PROCEDURE — 1036F TOBACCO NON-USER: CPT | Performed by: PODIATRIST

## 2025-04-22 PROCEDURE — 1160F RVW MEDS BY RX/DR IN RCRD: CPT | Performed by: PODIATRIST

## 2025-04-22 NOTE — PROGRESS NOTES
History of Present Illness:   This 89 year old male presents with wife  States is unable to trim nails on own  Denies trauma  No other pedal complaints    Past Medical History  Past Medical History:   Diagnosis Date    Elevated prostate specific antigen (PSA)     Elevated prostate specific antigen (PSA)    Other conditions influencing health status     Screening for malignant neoplasms, colon    Overactive bladder     Overactive bladder    Personal history of other endocrine, nutritional and metabolic disease 10/29/2013    History of type 1 diabetes mellitus       Medications and Allergies have been reviewed.    Review Of Systems:  GENERAL: No weight loss, malaise or fevers.  HEENT: Negative for frequent or significant headaches,   RESPIRATORY: Negative for cough, wheezing or shortness of breath.  CARDIOVASCULAR: Negative for chest pain, leg swelling or palpitations.    Examination of Both Lower Extremities:   Objective:   Vasc: DP and PT pulses are palpable bilateral 1/4.  CFT is less than 3 seconds bilateral.  Skin temperature is warm to cool proximal to distal bilateral.  Varicosities noted.     Neuro: Gross sensation intact b/l     Derm: Nails 1-5 bilateral are intact, thick and discolored. NO HPK tissue noted. There are no hyperkeratoses, ulcerations, verruca or other lesions noted.      Ortho: Muscle strength is 5/5 for all pedal groups tested.      1. DM (diabetes mellitus), type 2 with peripheral vascular complications        2. PVD (peripheral vascular disease) (CMS-HCC)        3. Foot pain, bilateral        4. Mycotic toenails          Patient educated on proper diabetic foot care.  Nails 1-5 b/l were debrided in thickness and length with nail cutting forceps.  Low risk pedal concerns noted.   A1C 6.4 Nov 2024  Patient to follow up in 6 mos or sooner if any problems arise.   Patient was in agreement to this plan. All questions answered.  Family ok to trim and file down nails.     PCP Dr. Nichols Last visit  Feb 2025    Araseli Walker, [place]  [phone]  [facility] [address]  Fax: [phone]

## 2025-05-06 ENCOUNTER — APPOINTMENT (OUTPATIENT)
Dept: PRIMARY CARE | Facility: CLINIC | Age: OVER 89
End: 2025-05-06
Payer: MEDICARE

## 2025-05-07 ENCOUNTER — APPOINTMENT (OUTPATIENT)
Dept: PRIMARY CARE | Facility: CLINIC | Age: OVER 89
End: 2025-05-07
Payer: MEDICARE

## 2025-05-07 VITALS
WEIGHT: 213.2 LBS | SYSTOLIC BLOOD PRESSURE: 138 MMHG | DIASTOLIC BLOOD PRESSURE: 66 MMHG | OXYGEN SATURATION: 99 % | HEART RATE: 77 BPM | BODY MASS INDEX: 32.42 KG/M2 | TEMPERATURE: 97.3 F

## 2025-05-07 DIAGNOSIS — Z79.01 ON WARFARIN THERAPY: ICD-10-CM

## 2025-05-07 DIAGNOSIS — E78.00 HYPERCHOLESTEREMIA: ICD-10-CM

## 2025-05-07 DIAGNOSIS — K21.9 GASTRO-ESOPHAGEAL REFLUX DISEASE WITHOUT ESOPHAGITIS: ICD-10-CM

## 2025-05-07 DIAGNOSIS — Z53.20 STATIN DECLINED: ICD-10-CM

## 2025-05-07 DIAGNOSIS — E11.51 DM (DIABETES MELLITUS), TYPE 2 WITH PERIPHERAL VASCULAR COMPLICATIONS: ICD-10-CM

## 2025-05-07 DIAGNOSIS — I50.42 CHRONIC COMBINED SYSTOLIC AND DIASTOLIC CONGESTIVE HEART FAILURE: Primary | ICD-10-CM

## 2025-05-07 DIAGNOSIS — I10 HYPERTENSION, UNSPECIFIED TYPE: ICD-10-CM

## 2025-05-07 PROCEDURE — 3078F DIAST BP <80 MM HG: CPT | Performed by: INTERNAL MEDICINE

## 2025-05-07 PROCEDURE — 1160F RVW MEDS BY RX/DR IN RCRD: CPT | Performed by: INTERNAL MEDICINE

## 2025-05-07 PROCEDURE — G2211 COMPLEX E/M VISIT ADD ON: HCPCS | Performed by: INTERNAL MEDICINE

## 2025-05-07 PROCEDURE — 1159F MED LIST DOCD IN RCRD: CPT | Performed by: INTERNAL MEDICINE

## 2025-05-07 PROCEDURE — 3075F SYST BP GE 130 - 139MM HG: CPT | Performed by: INTERNAL MEDICINE

## 2025-05-07 PROCEDURE — 1036F TOBACCO NON-USER: CPT | Performed by: INTERNAL MEDICINE

## 2025-05-07 PROCEDURE — 99214 OFFICE O/P EST MOD 30 MIN: CPT | Performed by: INTERNAL MEDICINE

## 2025-05-07 RX ORDER — PANTOPRAZOLE SODIUM 40 MG/1
40 TABLET, DELAYED RELEASE ORAL DAILY
Qty: 90 TABLET | Refills: 1 | Status: SHIPPED | OUTPATIENT
Start: 2025-05-07

## 2025-05-07 ASSESSMENT — ENCOUNTER SYMPTOMS
ABDOMINAL PAIN: 0
WHEEZING: 0
HYPERTENSION: 1
BLOOD IN STOOL: 0
DIFFICULTY URINATING: 0
FATIGUE: 0
DIZZINESS: 0
ARTHRALGIAS: 0
SORE THROAT: 0
DIARRHEA: 0
SINUS PAIN: 0
UNEXPECTED WEIGHT CHANGE: 0
FEVER: 0
BRUISES/BLEEDS EASILY: 0
COUGH: 0
PALPITATIONS: 0
HEADACHES: 0

## 2025-05-07 NOTE — PROGRESS NOTES
Subjective   Patient ID: Jayjay García is a 89 y.o. male who presents for Diabetes, Hyperlipidemia, Hypertension, and Foot Pain.    - Patient doing well no complaints medication reviewed  -Bilateral feet pain plantar fasciitis patient counseled about stretching exercises massaging the feet given instruction about the use  Continues Tylenol once or twice a day  - Counseled about alcohol moderation patient drinks 3 to 4 glasses of red wine daily patient does not 1 glass only patient aware of risk and benefit  -  hx of DM II, HFrEF, BPH, HLD, afib,.  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  - -Recurrent left pleural effusion compensated no recurrence.  s/p multiple taps, s/p Pleurx placement, removed 6/2021 no recurrence  -Status post abdominal aneurysm repair, Infrarenal AAA doing well follow-up vascular surgery as recommended no abdominal pain  - Diabetes diet controlled last hemoglobin A1c 6.2 good blood sugar  - Atherosclerotic artery disease compensated  - Hypertension controlled  - Benign prostatic hypertrophy symptoms are controlled continue with tamsulosin.  Follow-up 3 months             Diabetes  Pertinent negatives for hypoglycemia include no dizziness or headaches. Pertinent negatives for diabetes include no chest pain and no fatigue.   Hyperlipidemia  Pertinent negatives include no chest pain.   Hypertension  Pertinent negatives include no chest pain, headaches or palpitations.          Review of Systems   Constitutional:  Negative for fatigue, fever and unexpected weight change.   HENT:  Negative for congestion, ear discharge, ear pain, mouth sores, sinus pain and sore throat.    Eyes:  Negative for visual disturbance.   Respiratory:  Negative for cough and wheezing.    Cardiovascular:  Negative for chest pain, palpitations and leg swelling.    Gastrointestinal:  Negative for abdominal pain, blood in stool and diarrhea.   Genitourinary:  Negative for difficulty urinating.   Musculoskeletal:  Negative for arthralgias.   Skin:  Negative for rash.   Neurological:  Negative for dizziness and headaches.   Hematological:  Does not bruise/bleed easily.   Psychiatric/Behavioral:  Negative for behavioral problems.    All other systems reviewed and are negative.      Objective   Lab Results   Component Value Date    HGBA1C 6.4 11/06/2024      /66   Pulse 77   Temp 36.3 °C (97.3 °F)   Wt 96.7 kg (213 lb 3.2 oz)   SpO2 99%   BMI 32.42 kg/m²   Lab Results   Component Value Date    WBC 9.2 02/04/2025    HGB 13.2 02/04/2025    HCT 39.0 02/04/2025     02/04/2025    CHOL 143 11/06/2024    TRIG 102 11/06/2024    HDL 47.0 11/06/2024    ALT 10 11/06/2024    AST 12 11/06/2024     02/04/2025    K 4.5 02/04/2025     02/04/2025    CREATININE 0.95 02/04/2025    BUN 16 02/04/2025    CO2 24 02/04/2025    TSH 0.82 11/06/2024    INR 2.90 (N) 04/14/2025    HGBA1C 6.4 11/06/2024     par   Physical Exam  Vitals and nursing note reviewed.   Constitutional:       Appearance: Normal appearance.   HENT:      Head: Normocephalic.      Nose: Nose normal.   Eyes:      Conjunctiva/sclera: Conjunctivae normal.      Pupils: Pupils are equal, round, and reactive to light.   Cardiovascular:      Rate and Rhythm: Regular rhythm.   Pulmonary:      Effort: Pulmonary effort is normal.      Breath sounds: Normal breath sounds.   Abdominal:      General: Abdomen is flat.      Palpations: Abdomen is soft.   Musculoskeletal:      Cervical back: Neck supple.   Skin:     General: Skin is warm.   Neurological:      General: No focal deficit present.      Mental Status: He is oriented to person, place, and time.   Psychiatric:         Mood and Affect: Mood normal.         Assessment/Plan   Jayjay was seen today for diabetes, hyperlipidemia, hypertension and foot pain.  Diagnoses  and all orders for this visit:  Chronic combined systolic and diastolic congestive heart failure (Primary)  Hypercholesteremia  Gastro-esophageal reflux disease without esophagitis  -     pantoprazole (ProtoNix) 40 mg EC tablet; Take 1 tablet (40 mg) by mouth once daily. Do not crush, chew, or split.  On warfarin therapy  DM (diabetes mellitus), type 2 with peripheral vascular complications  Hypertension, unspecified type  Statin declined  -     Follow Up In Primary Care - Established; Future  Other orders  -     Follow Up In Primary Care - Established   - Patient doing well no complaints medication reviewed  -Bilateral feet pain plantar fasciitis patient counseled about stretching exercises massaging the feet given instruction about the use  Continues Tylenol once or twice a day  - Counseled about alcohol moderation patient drinks 3 to 4 glasses of red wine daily patient does not 1 glass only patient aware of risk and benefit  -  hx of DM II, HFrEF, BPH, HLD, afib,.  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  - -Recurrent left pleural effusion compensated no recurrence.  s/p multiple taps, s/p Pleurx placement, removed 6/2021 no recurrence  -Status post abdominal aneurysm repair, Infrarenal AAA doing well follow-up vascular surgery as recommended no abdominal pain  - Diabetes diet controlled last hemoglobin A1c 6.2 good blood sugar  - Atherosclerotic artery disease compensated  - Hypertension controlled  - Benign prostatic hypertrophy symptoms are controlled continue with tamsulosin.  Follow-up 3 months

## 2025-05-15 ENCOUNTER — APPOINTMENT (OUTPATIENT)
Dept: PHARMACY | Facility: HOSPITAL | Age: OVER 89
End: 2025-05-15
Payer: MEDICARE

## 2025-05-15 ENCOUNTER — ANTICOAGULATION - WARFARIN VISIT (OUTPATIENT)
Dept: PHARMACY | Facility: HOSPITAL | Age: OVER 89
End: 2025-05-15
Payer: MEDICARE

## 2025-05-15 DIAGNOSIS — I48.91 ATRIAL FIBRILLATION, UNSPECIFIED TYPE (MULTI): Primary | ICD-10-CM

## 2025-05-15 DIAGNOSIS — I48.11 LONGSTANDING PERSISTENT ATRIAL FIBRILLATION (MULTI): ICD-10-CM

## 2025-05-15 LAB
POC INR: 2.7 (ref 2–3)
POC PROTHROMBIN TIME: ABNORMAL

## 2025-05-15 PROCEDURE — 85610 PROTHROMBIN TIME: CPT | Mod: QW | Performed by: PHARMACY

## 2025-05-15 RX ORDER — WARFARIN 4 MG/1
TABLET ORAL
Qty: 102 TABLET | Refills: 1 | Status: SHIPPED | OUTPATIENT
Start: 2025-05-15

## 2025-05-15 NOTE — PROGRESS NOTES
Subjective     Jayjay García is a 89 y.o. male who presents for anticoagulation follow up.     Location: [facility]     Referring Provider: Lisa Nichols MD   INR Goal: 2.0-3.0  Indication: Atrial Fibrillation/Atrial Flutter    Bleeding signs/symptoms: No  Bruising: No   Major bleeding event: No  Thrombosis signs/symptoms: No  Thromboembolic event: No  Missed doses: No  Extra doses: No  Medication changes: No  Dietary changes: No  Change in health: No  Change in activity: No  Alcohol: No  Other concerns: No  Upcoming Surgeries: no  Parenteral anticoagulation: no    Current Outpatient Medications on File Prior to Visit   Medication Sig Dispense Refill    amLODIPine-benazepriL (Lotrel) 10-40 mg capsule Take 1 capsule by mouth once daily. 90 capsule 1    aspirin 81 mg EC tablet Take by mouth.      carvedilol (Coreg) 3.125 mg tablet Take 1 tablet (3.125 mg) by mouth 2 times daily (morning and late afternoon). 180 tablet 1    cholecalciferol (Vitamin D-3) 5,000 Units tablet Take by mouth.      metFORMIN (Glucophage) 1,000 mg tablet Take 1 tablet (1,000 mg) by mouth once daily. Take with food. 90 tablet 1    pantoprazole (ProtoNix) 40 mg EC tablet Take 1 tablet (40 mg) by mouth once daily. Do not crush, chew, or split. 90 tablet 1    rosuvastatin (Crestor) 10 mg tablet Take by mouth.      tamsulosin (Flomax) 0.4 mg 24 hr capsule Take 1 capsule (0.4 mg) by mouth once daily. 90 capsule 3    [DISCONTINUED] warfarin (Coumadin) 4 mg tablet Take 1.5 tablets (6 mg) by mouth on Mon/Wed/Fri and take 1 tablet (4 mg) by mouth all other days 102 tablet 1     No current facility-administered medications on file prior to visit.        Objective   Anticoagulation Summary  As of 5/15/2025      INR goal:  2.0-3.0   TTR:  52.1% (1.6 y)   INR used for dosin.70 (5/15/2025)   Weekly warfarin total:  34 mg             Lab Results   Component Value Date    INR 2.70 (N) 05/15/2025    INR 2.90 (N)  04/14/2025    INR 4.10 (A) 03/20/2025    PROTIME 19.8 (H) 02/04/2025    PROTIME 39.0 (H) 05/10/2024    PROTIME 2.0 (A) 03/20/2023    PROTIME 22.4 (H) 03/13/2023    PROTIME CANCELED 02/28/2023    PROTIME 18.1 (H) 09/02/2022       Assessment/Plan   Current INR is Therapeutic at 2.7. Previous INR was Therapeutic at 2.9. No changes reported from previous visit. As INR remains therapeutic, no changes to warfarin regimen needed. Advised patient to continue current warfarin regimen to 6 mg on Mon/Wed/Fri and 4 mg all other days. Refill sent to patient's preferred pharmacy. Plan for follow up in one month    Follow Up: 1 month - 6/12/25 @ 1040    Araseli Seaman, LianaD

## 2025-06-09 DIAGNOSIS — N40.1 BENIGN PROSTATIC HYPERPLASIA WITH LOWER URINARY TRACT SYMPTOMS, SYMPTOM DETAILS UNSPECIFIED: ICD-10-CM

## 2025-06-09 RX ORDER — TAMSULOSIN HYDROCHLORIDE 0.4 MG/1
0.4 CAPSULE ORAL DAILY
COMMUNITY
End: 2025-06-09 | Stop reason: SDUPTHER

## 2025-06-09 RX ORDER — TAMSULOSIN HYDROCHLORIDE 0.4 MG/1
0.4 CAPSULE ORAL DAILY
Qty: 90 CAPSULE | Refills: 0 | Status: SHIPPED | OUTPATIENT
Start: 2025-06-09

## 2025-06-12 ENCOUNTER — ANTICOAGULATION - WARFARIN VISIT (OUTPATIENT)
Dept: PHARMACY | Facility: HOSPITAL | Age: OVER 89
End: 2025-06-12
Payer: MEDICARE

## 2025-06-12 DIAGNOSIS — I48.91 ATRIAL FIBRILLATION, UNSPECIFIED TYPE (MULTI): Primary | ICD-10-CM

## 2025-06-12 DIAGNOSIS — I48.11 LONGSTANDING PERSISTENT ATRIAL FIBRILLATION (MULTI): ICD-10-CM

## 2025-06-12 LAB
POC INR: 3.4 (ref 2–3)
POC PROTHROMBIN TIME: ABNORMAL

## 2025-06-12 PROCEDURE — 85610 PROTHROMBIN TIME: CPT | Mod: QW | Performed by: PHARMACY

## 2025-06-12 NOTE — PROGRESS NOTES
Subjective     Jayjay García is a 89 y.o. male who presents for anticoagulation follow up.     Location: [facility]     Referring Provider: Lisa Nichols MD   INR Goal: 2.0-3.0  Indication: Atrial Fibrillation/Atrial Flutter    Bleeding signs/symptoms: No  Bruising: No   Major bleeding event: No  Thrombosis signs/symptoms: No  Thromboembolic event: No  Missed doses: No  Extra doses: No  Medication changes: No  Dietary changes: Yes  Change in health: No  Change in activity: No  Alcohol: No  Other concerns: No  Upcoming Surgeries: no  Parenteral anticoagulation: no    Current Outpatient Medications on File Prior to Visit   Medication Sig Dispense Refill    amLODIPine-benazepriL (Lotrel) 10-40 mg capsule Take 1 capsule by mouth once daily. 90 capsule 1    aspirin 81 mg EC tablet Take by mouth.      carvedilol (Coreg) 3.125 mg tablet Take 1 tablet (3.125 mg) by mouth 2 times daily (morning and late afternoon). 180 tablet 1    cholecalciferol (Vitamin D-3) 5,000 Units tablet Take by mouth.      metFORMIN (Glucophage) 1,000 mg tablet Take 1 tablet (1,000 mg) by mouth once daily. Take with food. 90 tablet 1    pantoprazole (ProtoNix) 40 mg EC tablet Take 1 tablet (40 mg) by mouth once daily. Do not crush, chew, or split. 90 tablet 1    rosuvastatin (Crestor) 10 mg tablet Take by mouth.      tamsulosin (Flomax) 0.4 mg 24 hr capsule Take 1 capsule (0.4 mg) by mouth once daily. Do not crush, chew, or split. 90 capsule 0    warfarin (Coumadin) 4 mg tablet Take 1.5 tablets (6 mg) by mouth on Mon/Wed/Fri and take 1 tablet (4 mg) by mouth all other days 102 tablet 1    [DISCONTINUED] tamsulosin (Flomax) 0.4 mg 24 hr capsule Take 1 capsule (0.4 mg) by mouth once daily. Do not crush, chew, or split.       No current facility-administered medications on file prior to visit.        Objective   Anticoagulation Summary  As of 6/12/2025      INR goal:  2.0-3.0   TTR:  51.7% (1.7 y)   INR used  for dosing:  3.40 (6/12/2025)   Weekly warfarin total:  34 mg               Lab Results   Component Value Date    INR 3.40 (A) 06/12/2025    INR 2.70 (N) 05/15/2025    INR 2.90 (N) 04/14/2025    PROTIME 19.8 (H) 02/04/2025    PROTIME 39.0 (H) 05/10/2024    PROTIME 2.0 (A) 03/20/2023    PROTIME 22.4 (H) 03/13/2023    PROTIME CANCELED 02/28/2023    PROTIME 18.1 (H) 09/02/2022       Assessment/Plan   Current INR is Therapeutic at 3.4. Previous INR was Therapeutic at 2.7. Patient's wife reported that he had more wine last week while friends were over. Planning to resume usual intake. Otherwise, no changes reported from previous visit. As INR is now supratherapeutic, plan to hold warfarin dose today. Then advised patient to resume usual warfarin regimen to 6 mg on Mon/Wed/Fri and 4 mg all other days. Requested follow up in 2 weeks, patient is not available to return until July.     Follow Up: 7/7/25 @ 1040    Araseli Seaman, LianaD

## 2025-07-07 ENCOUNTER — ANTICOAGULATION - WARFARIN VISIT (OUTPATIENT)
Dept: PHARMACY | Facility: HOSPITAL | Age: OVER 89
End: 2025-07-07
Payer: MEDICARE

## 2025-07-07 DIAGNOSIS — I48.11 LONGSTANDING PERSISTENT ATRIAL FIBRILLATION (MULTI): ICD-10-CM

## 2025-07-07 DIAGNOSIS — I48.91 ATRIAL FIBRILLATION, UNSPECIFIED TYPE (MULTI): Primary | ICD-10-CM

## 2025-07-07 LAB
POC INR: 4.1 (ref 2–3)
POC PROTHROMBIN TIME: ABNORMAL

## 2025-07-07 PROCEDURE — 85610 PROTHROMBIN TIME: CPT | Mod: QW | Performed by: PHARMACY

## 2025-07-07 NOTE — PROGRESS NOTES
Subjective     Jayjay García is a 89 y.o. male who presents for anticoagulation follow up.     Location: [facility]     Referring Provider: Lisa Nichols MD   INR Goal: 2.0-3.0  Indication: Atrial Fibrillation/Atrial Flutter    Bleeding signs/symptoms: No  Bruising: No   Major bleeding event: No  Thrombosis signs/symptoms: No  Thromboembolic event: No  Missed doses: No  Extra doses: No  Medication changes: No  Dietary changes: No  Change in health: No  Change in activity: No  Alcohol: No  Other concerns: No  Upcoming Surgeries: no  Parenteral anticoagulation: no    Current Outpatient Medications on File Prior to Visit   Medication Sig Dispense Refill    amLODIPine-benazepriL (Lotrel) 10-40 mg capsule Take 1 capsule by mouth once daily. 90 capsule 1    aspirin 81 mg EC tablet Take by mouth.      carvedilol (Coreg) 3.125 mg tablet Take 1 tablet (3.125 mg) by mouth 2 times daily (morning and late afternoon). 180 tablet 1    cholecalciferol (Vitamin D-3) 5,000 Units tablet Take by mouth.      metFORMIN (Glucophage) 1,000 mg tablet Take 1 tablet (1,000 mg) by mouth once daily. Take with food. 90 tablet 1    pantoprazole (ProtoNix) 40 mg EC tablet Take 1 tablet (40 mg) by mouth once daily. Do not crush, chew, or split. 90 tablet 1    rosuvastatin (Crestor) 10 mg tablet Take by mouth.      tamsulosin (Flomax) 0.4 mg 24 hr capsule Take 1 capsule (0.4 mg) by mouth once daily. Do not crush, chew, or split. 90 capsule 0    warfarin (Coumadin) 4 mg tablet Take 1.5 tablets (6 mg) by mouth on Mon/Wed/Fri and take 1 tablet (4 mg) by mouth all other days 102 tablet 1     No current facility-administered medications on file prior to visit.        Objective   Anticoagulation Summary  As of 2025      INR goal:  2.0-3.0   TTR:  49.6% (1.7 y)   INR used for dosin.10 (2025)   Weekly warfarin total:  30 mg               Lab Results   Component Value Date    INR 4.10 (A) 2025     INR 3.40 (A) 06/12/2025    INR 2.70 (N) 05/15/2025    PROTIME 19.8 (H) 02/04/2025    PROTIME 39.0 (H) 05/10/2024    PROTIME 2.0 (A) 03/20/2023    PROTIME 22.4 (H) 03/13/2023    PROTIME CANCELED 02/28/2023    PROTIME 18.1 (H) 09/02/2022       Assessment/Plan   Current INR is Supratherapeutic at 4.1. Previous INR was Supratherapeutic at 3.4. Patient's wife reported that he has been drinking more wine. Otherwise, no changes reported from previous visit. As INR remains supratherapeutic, plan to hold warfarin dose today. Then advised patient to lower usual warfarin regimen to 6 mg on Wednesdays and 4 mg all other days. Requested follow up in 2 weeks.    Follow Up: 7/21/25 @ 1100    Araseli Seaman, LianaD

## 2025-07-08 DIAGNOSIS — I11.0 HYPERTENSIVE HEART DISEASE WITH HEART FAILURE: ICD-10-CM

## 2025-07-08 DIAGNOSIS — E11.9: ICD-10-CM

## 2025-07-08 DIAGNOSIS — I10 PRIMARY HYPERTENSION: ICD-10-CM

## 2025-07-08 RX ORDER — METFORMIN HYDROCHLORIDE 1000 MG/1
1000 TABLET ORAL DAILY
Qty: 90 TABLET | Refills: 0 | Status: SHIPPED | OUTPATIENT
Start: 2025-07-08

## 2025-07-08 RX ORDER — CARVEDILOL 3.12 MG/1
3.12 TABLET ORAL
Qty: 180 TABLET | Refills: 0 | Status: SHIPPED | OUTPATIENT
Start: 2025-07-08

## 2025-07-08 RX ORDER — AMLODIPINE AND BENAZEPRIL HYDROCHLORIDE 10; 40 MG/1; MG/1
1 CAPSULE ORAL DAILY
Qty: 90 CAPSULE | Refills: 0 | Status: SHIPPED | OUTPATIENT
Start: 2025-07-08 | End: 2025-10-06

## 2025-07-17 ENCOUNTER — APPOINTMENT (OUTPATIENT)
Dept: PRIMARY CARE | Facility: CLINIC | Age: OVER 89
End: 2025-07-17
Payer: MEDICARE

## 2025-07-21 ENCOUNTER — ANTICOAGULATION - WARFARIN VISIT (OUTPATIENT)
Dept: PHARMACY | Facility: HOSPITAL | Age: OVER 89
End: 2025-07-21
Payer: MEDICARE

## 2025-07-21 DIAGNOSIS — I48.91 ATRIAL FIBRILLATION, UNSPECIFIED TYPE (MULTI): Primary | ICD-10-CM

## 2025-07-21 DIAGNOSIS — I48.11 LONGSTANDING PERSISTENT ATRIAL FIBRILLATION (MULTI): ICD-10-CM

## 2025-07-21 LAB
POC INR: 3.2 (ref 2–3)
POC PROTHROMBIN TIME: ABNORMAL

## 2025-07-21 PROCEDURE — 85610 PROTHROMBIN TIME: CPT | Mod: QW | Performed by: PHARMACY

## 2025-07-21 NOTE — PROGRESS NOTES
Subjective     Jayjay García is a 89 y.o. male who presents for anticoagulation follow up.     Location: [facility]     Referring Provider: Lisa Nichols MD   INR Goal: 2.0-3.0  Indication: Atrial Fibrillation/Atrial Flutter    Bleeding signs/symptoms: No  Bruising: No   Major bleeding event: No  Thrombosis signs/symptoms: No  Thromboembolic event: No  Missed doses: No  Extra doses: No  Medication changes: No  Dietary changes: No  Change in health: No  Change in activity: No  Alcohol: No  Other concerns: No  Upcoming Surgeries: no  Parenteral anticoagulation: no    Current Outpatient Medications on File Prior to Visit   Medication Sig Dispense Refill    amLODIPine-benazepriL (Lotrel) 10-40 mg capsule Take 1 capsule by mouth once daily. 90 capsule 0    aspirin 81 mg EC tablet Take by mouth.      carvedilol (Coreg) 3.125 mg tablet Take 1 tablet (3.125 mg) by mouth 2 times daily (morning and late afternoon). 180 tablet 0    cholecalciferol (Vitamin D-3) 5,000 Units tablet Take by mouth.      metFORMIN (Glucophage) 1,000 mg tablet Take 1 tablet (1,000 mg) by mouth once daily. Take with food. 90 tablet 0    pantoprazole (ProtoNix) 40 mg EC tablet Take 1 tablet (40 mg) by mouth once daily. Do not crush, chew, or split. 90 tablet 1    rosuvastatin (Crestor) 10 mg tablet Take by mouth.      tamsulosin (Flomax) 0.4 mg 24 hr capsule Take 1 capsule (0.4 mg) by mouth once daily. Do not crush, chew, or split. 90 capsule 0    warfarin (Coumadin) 4 mg tablet Take 1.5 tablets (6 mg) by mouth on Mon/Wed/Fri and take 1 tablet (4 mg) by mouth all other days 102 tablet 1     No current facility-administered medications on file prior to visit.        Objective   Anticoagulation Summary  As of 7/21/2025      INR goal:  2.0-3.0   TTR:  48.6% (1.8 y)   INR used for dosing:  3.20 (7/21/2025)   Weekly warfarin total:  28 mg                 Lab Results   Component Value Date    INR 3.20 (A)  07/21/2025    INR 4.10 (A) 07/07/2025    INR 3.40 (A) 06/12/2025    PROTIME 19.8 (H) 02/04/2025    PROTIME 39.0 (H) 05/10/2024    PROTIME 2.0 (A) 03/20/2023    PROTIME 22.4 (H) 03/13/2023    PROTIME CANCELED 02/28/2023    PROTIME 18.1 (H) 09/02/2022       Assessment/Plan   Current INR is Supratherapeutic at 3.2. Previous INR was also Supratherapeutic at 4.1. No signs or symptoms of bleeding/bruising reported. Patient's wife reported that he has been drinking more wine. Otherwise, no changes reported from previous visit. As INR remains supratherapeutic, plan to lower usual warfarin regimen to 4 mg daily.     Follow Up: 4 weeks    Araseli Seaman, LianaD

## 2025-07-22 ENCOUNTER — APPOINTMENT (OUTPATIENT)
Dept: PODIATRY | Facility: CLINIC | Age: OVER 89
End: 2025-07-22
Payer: MEDICARE

## 2025-07-22 DIAGNOSIS — B35.1 MYCOTIC TOENAILS: ICD-10-CM

## 2025-07-22 DIAGNOSIS — Z79.01 CHRONIC ANTICOAGULATION: ICD-10-CM

## 2025-07-22 DIAGNOSIS — M79.672 FOOT PAIN, BILATERAL: ICD-10-CM

## 2025-07-22 DIAGNOSIS — M79.671 FOOT PAIN, BILATERAL: ICD-10-CM

## 2025-07-22 DIAGNOSIS — I73.9 PVD (PERIPHERAL VASCULAR DISEASE): Primary | ICD-10-CM

## 2025-07-22 PROCEDURE — 1160F RVW MEDS BY RX/DR IN RCRD: CPT | Performed by: PODIATRIST

## 2025-07-22 PROCEDURE — 11721 DEBRIDE NAIL 6 OR MORE: CPT | Performed by: PODIATRIST

## 2025-07-22 PROCEDURE — 1159F MED LIST DOCD IN RCRD: CPT | Performed by: PODIATRIST

## 2025-07-23 NOTE — PROGRESS NOTES
History of Present Illness:   This 89 year old male presents with wife  States is unable to trim nails on own  Denies trauma  No other pedal complaints    Past Medical History  Past Medical History:   Diagnosis Date    Elevated prostate specific antigen (PSA)     Elevated prostate specific antigen (PSA)    Other conditions influencing health status     Screening for malignant neoplasms, colon    Overactive bladder     Overactive bladder    Personal history of other endocrine, nutritional and metabolic disease 10/29/2013    History of type 1 diabetes mellitus       Medications and Allergies have been reviewed.    Review Of Systems:  GENERAL: No weight loss, malaise or fevers.  HEENT: Negative for frequent or significant headaches,   RESPIRATORY: Negative for cough, wheezing or shortness of breath.  CARDIOVASCULAR: Negative for chest pain, leg swelling or palpitations.    Examination of Both Lower Extremities:   Objective:   Vasc: DP and PT pulses are palpable bilateral 1/4.  CFT is less than 3 seconds bilateral.  Skin temperature is warm to cool proximal to distal bilateral.  Varicosities noted.     Neuro: Gross sensation intact b/l     Derm: Nails 1-5 bilateral are intact, thick and discolored. NO HPK tissue noted. There are no hyperkeratoses, ulcerations, verruca or other lesions noted.      Ortho: Muscle strength is 5/5 for all pedal groups tested.      1. DM (diabetes mellitus), type 2 with peripheral vascular complications        2. PVD (peripheral vascular disease) (CMS-HCC)        3. Foot pain, bilateral        4. Mycotic toenails          Patient educated on proper diabetic foot care.  Nails 1-5 b/l were debrided in thickness and length with nail cutting forceps.  Low risk pedal concerns noted.   A1C 6.4 Nov 2024  Patient to follow up in 6 mos or sooner if any problems arise.   Patient was in agreement to this plan. All questions answered.  Family ok to trim and file down nails.     PCP Dr. Nichols Last visit  Feb 2025    Araseli Walker, [place]  [phone]  [facility]  Fax: [phone]

## 2025-08-07 ENCOUNTER — APPOINTMENT (OUTPATIENT)
Dept: PRIMARY CARE | Facility: CLINIC | Age: OVER 89
End: 2025-08-07
Payer: MEDICARE

## 2025-08-07 VITALS
BODY MASS INDEX: 32.6 KG/M2 | OXYGEN SATURATION: 99 % | WEIGHT: 214.4 LBS | SYSTOLIC BLOOD PRESSURE: 110 MMHG | TEMPERATURE: 97.4 F | HEART RATE: 87 BPM | DIASTOLIC BLOOD PRESSURE: 62 MMHG

## 2025-08-07 DIAGNOSIS — R53.83 OTHER FATIGUE: ICD-10-CM

## 2025-08-07 DIAGNOSIS — F01.C4 SEVERE VASCULAR DEMENTIA WITH ANXIETY: ICD-10-CM

## 2025-08-07 DIAGNOSIS — J44.9 CHRONIC OBSTRUCTIVE PULMONARY DISEASE, UNSPECIFIED COPD TYPE (MULTI): ICD-10-CM

## 2025-08-07 DIAGNOSIS — E55.9 VITAMIN D DEFICIENCY, UNSPECIFIED: ICD-10-CM

## 2025-08-07 DIAGNOSIS — R73.09 ABNORMAL BLOOD SUGAR: ICD-10-CM

## 2025-08-07 DIAGNOSIS — E53.8 VITAMIN B12 DEFICIENCY: ICD-10-CM

## 2025-08-07 DIAGNOSIS — I10 HYPERTENSION, UNSPECIFIED TYPE: ICD-10-CM

## 2025-08-07 DIAGNOSIS — E78.00 HYPERCHOLESTEREMIA: ICD-10-CM

## 2025-08-07 DIAGNOSIS — Z79.899 HIGH RISK MEDICATION USE: Primary | ICD-10-CM

## 2025-08-07 PROCEDURE — G2211 COMPLEX E/M VISIT ADD ON: HCPCS | Performed by: INTERNAL MEDICINE

## 2025-08-07 PROCEDURE — 3078F DIAST BP <80 MM HG: CPT | Performed by: INTERNAL MEDICINE

## 2025-08-07 PROCEDURE — 3074F SYST BP LT 130 MM HG: CPT | Performed by: INTERNAL MEDICINE

## 2025-08-07 PROCEDURE — 1159F MED LIST DOCD IN RCRD: CPT | Performed by: INTERNAL MEDICINE

## 2025-08-07 PROCEDURE — 99214 OFFICE O/P EST MOD 30 MIN: CPT | Performed by: INTERNAL MEDICINE

## 2025-08-07 PROCEDURE — 1160F RVW MEDS BY RX/DR IN RCRD: CPT | Performed by: INTERNAL MEDICINE

## 2025-08-07 RX ORDER — DONEPEZIL HYDROCHLORIDE 5 MG/1
5 TABLET, FILM COATED ORAL NIGHTLY
Qty: 30 TABLET | Refills: 5 | Status: SHIPPED | OUTPATIENT
Start: 2025-08-07 | End: 2026-02-03

## 2025-08-07 ASSESSMENT — ENCOUNTER SYMPTOMS
WHEEZING: 0
DIARRHEA: 0
COUGH: 0
CONFUSION: 1
HEADACHES: 0
SORE THROAT: 0
SINUS PAIN: 0
ABDOMINAL PAIN: 0
FEVER: 0
FATIGUE: 0
UNEXPECTED WEIGHT CHANGE: 0
ARTHRALGIAS: 0
DIFFICULTY URINATING: 0
BRUISES/BLEEDS EASILY: 0
PALPITATIONS: 0
DIZZINESS: 0
BLOOD IN STOOL: 0

## 2025-08-07 NOTE — PROGRESS NOTES
Subjective   Patient ID: Jayjay García is a 89 y.o. male who presents for Memory Loss.     Patient doing well no complaints medication reviewed  - Patient family concerned about worsening dementia and memory  Mini-Mental test today score 16/30 counseled about multivitamin need to complete blood work maximize medical management  Started Aricept 5 mg daily follow-up blood work results closely discussed with family MRI and CT scan patient family declined because patient age and no acute changes symptoms are progressively worsening over the last few years  - Discussed with patient and family safety at home and risk for fall prevention  -- Counseled about alcohol moderation patient drinks 3 to 4 glasses of red wine daily patient does not 1 glass only patient aware of risk and benefit  -  hx of DM II, HFrEF, BPH, HLD, afib,.  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  - -Recurrent left pleural effusion compensated no recurrence.  s/p multiple taps, s/p Pleurx placement, removed 6/2021 no recurrence  -Status post abdominal aneurysm repair, Infrarenal AAA doing well follow-up vascular surgery as recommended no abdominal pain  - Diabetes diet controlled last hemoglobin A1c 6.2 good blood sugar  - Atherosclerotic artery disease compensated  - Hypertension controlled  - Benign prostatic hypertrophy symptoms are controlled continue with tamsulosin.  Follow-up 3 months        Memory Loss             Review of Systems   Constitutional:  Negative for fatigue, fever and unexpected weight change.   HENT:  Negative for congestion, ear discharge, ear pain, mouth sores, sinus pain and sore throat.    Eyes:  Negative for visual disturbance.   Respiratory:  Negative for cough and wheezing.    Cardiovascular:  Negative for chest pain, palpitations and leg swelling.    Gastrointestinal:  Negative for abdominal pain, blood in stool and diarrhea.   Genitourinary:  Negative for difficulty urinating.   Musculoskeletal:  Negative for arthralgias.   Skin:  Negative for rash.   Neurological:  Negative for dizziness and headaches.   Hematological:  Does not bruise/bleed easily.   Psychiatric/Behavioral:  Positive for confusion. Negative for behavioral problems.    All other systems reviewed and are negative.      Objective   Lab Results   Component Value Date    HGBA1C 6.4 11/06/2024      /62   Pulse 87   Temp 36.3 °C (97.4 °F)   Wt 97.3 kg (214 lb 6.4 oz)   SpO2 99%   BMI 32.60 kg/m²     Physical Exam  Vitals and nursing note reviewed.   Constitutional:       Appearance: Normal appearance.   HENT:      Head: Normocephalic.      Nose: Nose normal.     Eyes:      Conjunctiva/sclera: Conjunctivae normal.      Pupils: Pupils are equal, round, and reactive to light.       Cardiovascular:      Rate and Rhythm: Regular rhythm.   Pulmonary:      Effort: Pulmonary effort is normal.      Breath sounds: Normal breath sounds.   Abdominal:      General: Abdomen is flat.      Palpations: Abdomen is soft.     Musculoskeletal:      Cervical back: Neck supple.      Right lower leg: No edema.      Left lower leg: No edema.     Skin:     General: Skin is warm.     Neurological:      General: No focal deficit present.      Mental Status: He is disoriented.      Gait: Gait abnormal.      Comments: Mini-Mental test 16/30 August 7, 2025   Psychiatric:         Mood and Affect: Mood normal.         Assessment/Plan   Jayjay was seen today for memory loss.  Diagnoses and all orders for this visit:  High risk medication use (Primary)  -     CBC and Auto Differential; Future  -     CBC and Auto Differential  Hypertension, unspecified type  -     Comprehensive Metabolic Panel; Future  -     Comprehensive Metabolic Panel  Abnormal blood sugar  -     Hemoglobin A1C; Future  -     Hemoglobin  A1C  Hypercholesteremia  -     Lipid Panel; Future  -     Lipid Panel  Other fatigue  -     TSH with reflex to Free T4 if abnormal; Future  -     TSH with reflex to Free T4 if abnormal  Vitamin B12 deficiency  -     Vitamin B12; Future  -     Vitamin B12  Vitamin D deficiency, unspecified  -     Vitamin D 25-Hydroxy,Total (for eval of Vitamin D levels); Future  -     Vitamin D 25-Hydroxy,Total (for eval of Vitamin D levels)  Severe vascular dementia with anxiety  Other orders  -     Follow Up In Primary Care - Established; Future    Patient doing well no complaints medication reviewed  - Patient family concerned about worsening dementia and memory  Mini-Mental test today score 16/30 counseled about multivitamin need to complete blood work maximize medical management  Started Aricept 5 mg daily follow-up blood work results closely discussed with family MRI and CT scan patient family declined because patient age and no acute changes symptoms are progressively worsening over the last few years  - Discussed with patient and family safety at home and risk for fall prevention  -- Counseled about alcohol moderation patient drinks 3 to 4 glasses of red wine daily patient does not 1 glass only patient aware of risk and benefit  -  hx of DM II, HFrEF, BPH, HLD, afib,.  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  - -Recurrent left pleural effusion compensated no recurrence.  s/p multiple taps, s/p Pleurx placement, removed 6/2021 no recurrence  -Status post abdominal aneurysm repair, Infrarenal AAA doing well follow-up vascular surgery as recommended no abdominal pain  - Diabetes diet controlled last hemoglobin A1c 6.2 good blood sugar  - Atherosclerotic artery disease compensated  - Hypertension controlled  - Benign prostatic hypertrophy symptoms are controlled continue  with tamsulosin.  Follow-up 3 months

## 2025-08-08 LAB
25(OH)D3+25(OH)D2 SERPL-MCNC: 57 NG/ML (ref 30–100)
ALBUMIN SERPL-MCNC: 4.2 G/DL (ref 3.6–5.1)
ALP SERPL-CCNC: 70 U/L (ref 35–144)
ALT SERPL-CCNC: 8 U/L (ref 9–46)
ANION GAP SERPL CALCULATED.4IONS-SCNC: 12 MMOL/L (CALC) (ref 7–17)
AST SERPL-CCNC: 10 U/L (ref 10–35)
BASOPHILS # BLD AUTO: 26 CELLS/UL (ref 0–200)
BASOPHILS NFR BLD AUTO: 0.3 %
BILIRUB SERPL-MCNC: 0.5 MG/DL (ref 0.2–1.2)
BUN SERPL-MCNC: 13 MG/DL (ref 7–25)
CALCIUM SERPL-MCNC: 9.8 MG/DL (ref 8.6–10.3)
CHLORIDE SERPL-SCNC: 101 MMOL/L (ref 98–110)
CHOLEST SERPL-MCNC: 150 MG/DL
CHOLEST/HDLC SERPL: 3.1 (CALC)
CO2 SERPL-SCNC: 26 MMOL/L (ref 20–32)
CREAT SERPL-MCNC: 0.91 MG/DL (ref 0.7–1.22)
EGFRCR SERPLBLD CKD-EPI 2021: 81 ML/MIN/1.73M2
EOSINOPHIL # BLD AUTO: 138 CELLS/UL (ref 15–500)
EOSINOPHIL NFR BLD AUTO: 1.6 %
ERYTHROCYTE [DISTWIDTH] IN BLOOD BY AUTOMATED COUNT: 13.8 % (ref 11–15)
EST. AVERAGE GLUCOSE BLD GHB EST-MCNC: 154 MG/DL
EST. AVERAGE GLUCOSE BLD GHB EST-SCNC: 8.5 MMOL/L
GLUCOSE SERPL-MCNC: 94 MG/DL (ref 65–139)
HBA1C MFR BLD: 7 %
HCT VFR BLD AUTO: 45.4 % (ref 38.5–50)
HDLC SERPL-MCNC: 49 MG/DL
HGB BLD-MCNC: 14.8 G/DL (ref 13.2–17.1)
LDLC SERPL CALC-MCNC: 77 MG/DL (CALC)
LYMPHOCYTES # BLD AUTO: 602 CELLS/UL (ref 850–3900)
LYMPHOCYTES NFR BLD AUTO: 7 %
MCH RBC QN AUTO: 31.8 PG (ref 27–33)
MCHC RBC AUTO-ENTMCNC: 32.6 G/DL (ref 32–36)
MCV RBC AUTO: 97.4 FL (ref 80–100)
MONOCYTES # BLD AUTO: 989 CELLS/UL (ref 200–950)
MONOCYTES NFR BLD AUTO: 11.5 %
NEUTROPHILS # BLD AUTO: 6846 CELLS/UL (ref 1500–7800)
NEUTROPHILS NFR BLD AUTO: 79.6 %
NONHDLC SERPL-MCNC: 101 MG/DL (CALC)
PLATELET # BLD AUTO: 289 THOUSAND/UL (ref 140–400)
PMV BLD REES-ECKER: 10.5 FL (ref 7.5–12.5)
POTASSIUM SERPL-SCNC: 4 MMOL/L (ref 3.5–5.3)
PROT SERPL-MCNC: 7.7 G/DL (ref 6.1–8.1)
RBC # BLD AUTO: 4.66 MILLION/UL (ref 4.2–5.8)
SODIUM SERPL-SCNC: 139 MMOL/L (ref 135–146)
TRIGL SERPL-MCNC: 144 MG/DL
TSH SERPL-ACNC: 0.67 MIU/L (ref 0.4–4.5)
VIT B12 SERPL-MCNC: 336 PG/ML (ref 200–1100)
WBC # BLD AUTO: 8.6 THOUSAND/UL (ref 3.8–10.8)

## 2025-08-18 ENCOUNTER — ANTICOAGULATION - WARFARIN VISIT (OUTPATIENT)
Dept: PHARMACY | Facility: HOSPITAL | Age: OVER 89
End: 2025-08-18
Payer: MEDICARE

## 2025-08-18 DIAGNOSIS — I48.11 LONGSTANDING PERSISTENT ATRIAL FIBRILLATION (MULTI): ICD-10-CM

## 2025-08-18 DIAGNOSIS — I48.91 ATRIAL FIBRILLATION, UNSPECIFIED TYPE (MULTI): Primary | ICD-10-CM

## 2025-08-18 LAB
POC INR: 2 (ref 2–3)
POC PROTHROMBIN TIME: ABNORMAL

## 2025-08-18 PROCEDURE — 85610 PROTHROMBIN TIME: CPT | Mod: QW | Performed by: PHARMACY

## 2025-11-10 ENCOUNTER — APPOINTMENT (OUTPATIENT)
Dept: PRIMARY CARE | Facility: CLINIC | Age: OVER 89
End: 2025-11-10
Payer: MEDICARE

## 2026-01-13 ENCOUNTER — APPOINTMENT (OUTPATIENT)
Dept: PODIATRY | Facility: CLINIC | Age: OVER 89
End: 2026-01-13
Payer: MEDICARE